# Patient Record
Sex: FEMALE | Race: WHITE | NOT HISPANIC OR LATINO | Employment: PART TIME | URBAN - METROPOLITAN AREA
[De-identification: names, ages, dates, MRNs, and addresses within clinical notes are randomized per-mention and may not be internally consistent; named-entity substitution may affect disease eponyms.]

---

## 2017-10-06 ENCOUNTER — RECORDS - HEALTHEAST (OUTPATIENT)
Dept: LAB | Facility: CLINIC | Age: 52
End: 2017-10-06

## 2017-10-06 LAB
CHOLEST SERPL-MCNC: 133 MG/DL
FASTING STATUS PATIENT QL REPORTED: ABNORMAL
HDLC SERPL-MCNC: 43 MG/DL
LDLC SERPL CALC-MCNC: 54 MG/DL
TRIGL SERPL-MCNC: 179 MG/DL

## 2018-01-17 ENCOUNTER — RECORDS - HEALTHEAST (OUTPATIENT)
Dept: LAB | Facility: CLINIC | Age: 53
End: 2018-01-17

## 2018-01-17 LAB
ANION GAP SERPL CALCULATED.3IONS-SCNC: 11 MMOL/L (ref 5–18)
BUN SERPL-MCNC: 12 MG/DL (ref 8–22)
CALCIUM SERPL-MCNC: 9.6 MG/DL (ref 8.5–10.5)
CHLORIDE BLD-SCNC: 100 MMOL/L (ref 98–107)
CO2 SERPL-SCNC: 25 MMOL/L (ref 22–31)
CREAT SERPL-MCNC: 0.73 MG/DL (ref 0.6–1.1)
GFR SERPL CREATININE-BSD FRML MDRD: >60 ML/MIN/1.73M2
GLUCOSE BLD-MCNC: 188 MG/DL (ref 70–125)
POTASSIUM BLD-SCNC: 4.3 MMOL/L (ref 3.5–5)
SODIUM SERPL-SCNC: 136 MMOL/L (ref 136–145)

## 2018-02-19 ENCOUNTER — HOSPITAL ENCOUNTER (OUTPATIENT)
Dept: LAB | Age: 53
Setting detail: SPECIMEN
Discharge: HOME | End: 2018-02-19

## 2018-02-19 ENCOUNTER — RECORDS - HEALTHEAST (OUTPATIENT)
Dept: LAB | Facility: CLINIC | Age: 53
End: 2018-02-19

## 2018-02-19 LAB — HIV 1+2 AB+HIV1 P24 AG SERPL QL IA: NEGATIVE

## 2018-02-20 LAB
C TRACH DNA SPEC QL PROBE+SIG AMP: NEGATIVE
SYPHILIS RPR SCREEN - HISTORICAL: NORMAL

## 2018-03-10 ENCOUNTER — RECORDS - HEALTHEAST (OUTPATIENT)
Dept: LAB | Facility: CLINIC | Age: 53
End: 2018-03-10

## 2018-03-12 LAB — BACTERIA SPEC CULT: NORMAL

## 2018-07-03 ENCOUNTER — RECORDS - HEALTHEAST (OUTPATIENT)
Dept: LAB | Facility: CLINIC | Age: 53
End: 2018-07-03

## 2018-07-04 LAB — BACTERIA SPEC CULT: NO GROWTH

## 2018-11-28 ENCOUNTER — RECORDS - HEALTHEAST (OUTPATIENT)
Dept: LAB | Facility: CLINIC | Age: 53
End: 2018-11-28

## 2018-11-28 LAB
CHOLEST SERPL-MCNC: 138 MG/DL
FASTING STATUS PATIENT QL REPORTED: ABNORMAL
HDLC SERPL-MCNC: 38 MG/DL
LDLC SERPL CALC-MCNC: 50 MG/DL
TRIGL SERPL-MCNC: 250 MG/DL

## 2019-01-03 ENCOUNTER — RECORDS - HEALTHEAST (OUTPATIENT)
Dept: LAB | Facility: CLINIC | Age: 54
End: 2019-01-03

## 2019-01-03 LAB
ALBUMIN SERPL-MCNC: 3.6 G/DL (ref 3.5–5)
ALP SERPL-CCNC: 109 U/L (ref 45–120)
ALT SERPL W P-5'-P-CCNC: 17 U/L (ref 0–45)
ANION GAP SERPL CALCULATED.3IONS-SCNC: 8 MMOL/L (ref 5–18)
AST SERPL W P-5'-P-CCNC: 12 U/L (ref 0–40)
BILIRUB SERPL-MCNC: 0.4 MG/DL (ref 0–1)
BUN SERPL-MCNC: 9 MG/DL (ref 8–22)
CALCIUM SERPL-MCNC: 9.7 MG/DL (ref 8.5–10.5)
CHLORIDE BLD-SCNC: 100 MMOL/L (ref 98–107)
CO2 SERPL-SCNC: 30 MMOL/L (ref 22–31)
CREAT SERPL-MCNC: 0.75 MG/DL (ref 0.6–1.1)
GFR SERPL CREATININE-BSD FRML MDRD: >60 ML/MIN/1.73M2
GLUCOSE BLD-MCNC: 201 MG/DL (ref 70–125)
POTASSIUM BLD-SCNC: 4.7 MMOL/L (ref 3.5–5)
PROT SERPL-MCNC: 6.3 G/DL (ref 6–8)
SODIUM SERPL-SCNC: 138 MMOL/L (ref 136–145)

## 2019-11-29 ENCOUNTER — RECORDS - HEALTHEAST (OUTPATIENT)
Dept: LAB | Facility: CLINIC | Age: 54
End: 2019-11-29

## 2019-11-29 LAB
ANION GAP SERPL CALCULATED.3IONS-SCNC: 8 MMOL/L (ref 5–18)
BUN SERPL-MCNC: 12 MG/DL (ref 8–22)
CALCIUM SERPL-MCNC: 9.7 MG/DL (ref 8.5–10.5)
CHLORIDE BLD-SCNC: 101 MMOL/L (ref 98–107)
CHOLEST SERPL-MCNC: 155 MG/DL
CO2 SERPL-SCNC: 28 MMOL/L (ref 22–31)
CREAT SERPL-MCNC: 0.71 MG/DL (ref 0.6–1.1)
FASTING STATUS PATIENT QL REPORTED: ABNORMAL
GFR SERPL CREATININE-BSD FRML MDRD: >60 ML/MIN/1.73M2
GLUCOSE BLD-MCNC: 164 MG/DL (ref 70–125)
HDLC SERPL-MCNC: 41 MG/DL
LDLC SERPL CALC-MCNC: 62 MG/DL
POTASSIUM BLD-SCNC: 4.9 MMOL/L (ref 3.5–5)
SODIUM SERPL-SCNC: 137 MMOL/L (ref 136–145)
TRIGL SERPL-MCNC: 262 MG/DL

## 2020-01-23 ENCOUNTER — RECORDS - HEALTHEAST (OUTPATIENT)
Dept: LAB | Facility: CLINIC | Age: 55
End: 2020-01-23

## 2020-01-24 LAB — BACTERIA SPEC CULT: NO GROWTH

## 2020-02-29 ENCOUNTER — RECORDS - HEALTHEAST (OUTPATIENT)
Dept: ADMINISTRATIVE | Facility: OTHER | Age: 55
End: 2020-02-29

## 2020-03-11 LAB
HPV SOURCE: NORMAL
HUMAN PAPILLOMA VIRUS 16 DNA: NEGATIVE
HUMAN PAPILLOMA VIRUS 18 DNA: NEGATIVE
HUMAN PAPILLOMA VIRUS FINAL DIAGNOSIS: NORMAL
HUMAN PAPILLOMA VIRUS OTHER HR: NEGATIVE
SPECIMEN DESCRIPTION: NORMAL

## 2020-03-18 ENCOUNTER — RECORDS - HEALTHEAST (OUTPATIENT)
Dept: ADMINISTRATIVE | Facility: OTHER | Age: 55
End: 2020-03-18

## 2020-03-18 LAB
BKR LAB AP ABNORMAL BLEEDING: NO
BKR LAB AP BIRTH CONTROL/HORMONES: NORMAL
BKR LAB AP CERVICAL APPEARANCE: NORMAL
BKR LAB AP GYN ADEQUACY: NORMAL
BKR LAB AP GYN INTERPRETATION: NORMAL
BKR LAB AP HPV REFLEX: NORMAL
BKR LAB AP LMP: NORMAL
BKR LAB AP PATIENT STATUS: NORMAL
BKR LAB AP PREVIOUS ABNORMAL: 2005
BKR LAB AP PREVIOUS NORMAL: NORMAL
HIGH RISK?: NO
PATH REPORT.COMMENTS IMP SPEC: NORMAL
RESULT FLAG (HE HISTORICAL CONVERSION): NORMAL

## 2020-11-02 ENCOUNTER — RECORDS - HEALTHEAST (OUTPATIENT)
Dept: LAB | Facility: CLINIC | Age: 55
End: 2020-11-02

## 2020-11-02 LAB
ANION GAP SERPL CALCULATED.3IONS-SCNC: 8 MMOL/L (ref 5–18)
BUN SERPL-MCNC: 11 MG/DL (ref 8–22)
CALCIUM SERPL-MCNC: 8.8 MG/DL (ref 8.5–10.5)
CHLORIDE BLD-SCNC: 102 MMOL/L (ref 98–107)
CHOLEST SERPL-MCNC: 144 MG/DL
CO2 SERPL-SCNC: 25 MMOL/L (ref 22–31)
CREAT SERPL-MCNC: 0.72 MG/DL (ref 0.6–1.1)
FASTING STATUS PATIENT QL REPORTED: ABNORMAL
GFR SERPL CREATININE-BSD FRML MDRD: >60 ML/MIN/1.73M2
GLUCOSE BLD-MCNC: 369 MG/DL (ref 70–125)
HDLC SERPL-MCNC: 41 MG/DL
LDLC SERPL CALC-MCNC: 48 MG/DL
POTASSIUM BLD-SCNC: 4.6 MMOL/L (ref 3.5–5)
SODIUM SERPL-SCNC: 135 MMOL/L (ref 136–145)
TRIGL SERPL-MCNC: 277 MG/DL

## 2021-05-06 ENCOUNTER — RECORDS - HEALTHEAST (OUTPATIENT)
Dept: LAB | Facility: CLINIC | Age: 56
End: 2021-05-06

## 2021-05-06 LAB
CREAT UR-MCNC: 87 MG/DL
MICROALBUMIN UR-MCNC: 0.82 MG/DL (ref 0–1.99)
MICROALBUMIN/CREAT UR: 9.4 MG/G

## 2021-05-27 ENCOUNTER — RECORDS - HEALTHEAST (OUTPATIENT)
Dept: LAB | Facility: CLINIC | Age: 56
End: 2021-05-27

## 2021-05-27 LAB
C REACTIVE PROTEIN LHE: 0.5 MG/DL (ref 0–0.8)
CK SERPL-CCNC: 82 U/L (ref 30–190)
ERYTHROCYTE [SEDIMENTATION RATE] IN BLOOD BY WESTERGREN METHOD: 13 MM/HR (ref 0–20)

## 2021-05-28 LAB — B BURGDOR IGG+IGM SER QL: 0.09 INDEX VALUE

## 2021-05-31 ENCOUNTER — RECORDS - HEALTHEAST (OUTPATIENT)
Dept: ADMINISTRATIVE | Facility: CLINIC | Age: 56
End: 2021-05-31

## 2021-06-04 VITALS — WEIGHT: 214 LBS | BODY MASS INDEX: 36.73 KG/M2

## 2021-12-20 ENCOUNTER — LAB REQUISITION (OUTPATIENT)
Dept: LAB | Facility: CLINIC | Age: 56
End: 2021-12-20
Payer: COMMERCIAL

## 2021-12-20 DIAGNOSIS — E11.65 TYPE 2 DIABETES MELLITUS WITH HYPERGLYCEMIA (H): ICD-10-CM

## 2021-12-20 DIAGNOSIS — E78.1 PURE HYPERGLYCERIDEMIA: ICD-10-CM

## 2021-12-20 LAB
ANION GAP SERPL CALCULATED.3IONS-SCNC: 11 MMOL/L (ref 5–18)
BUN SERPL-MCNC: 14 MG/DL (ref 8–22)
CALCIUM SERPL-MCNC: 10.6 MG/DL (ref 8.5–10.5)
CHLORIDE BLD-SCNC: 100 MMOL/L (ref 98–107)
CHOLEST SERPL-MCNC: 165 MG/DL
CO2 SERPL-SCNC: 29 MMOL/L (ref 22–31)
CREAT SERPL-MCNC: 0.76 MG/DL (ref 0.6–1.1)
FASTING STATUS PATIENT QL REPORTED: ABNORMAL
GFR SERPL CREATININE-BSD FRML MDRD: 89 ML/MIN/1.73M2
GLUCOSE BLD-MCNC: 146 MG/DL (ref 70–125)
HDLC SERPL-MCNC: 39 MG/DL
LDLC SERPL CALC-MCNC: 97 MG/DL
LDLC SERPL CALC-MCNC: ABNORMAL MG/DL
POTASSIUM BLD-SCNC: 4.5 MMOL/L (ref 3.5–5)
SODIUM SERPL-SCNC: 140 MMOL/L (ref 136–145)
TRIGL SERPL-MCNC: 436 MG/DL

## 2021-12-20 PROCEDURE — 80048 BASIC METABOLIC PNL TOTAL CA: CPT | Performed by: FAMILY MEDICINE

## 2021-12-20 PROCEDURE — 80061 LIPID PANEL: CPT | Mod: ORL | Performed by: FAMILY MEDICINE

## 2021-12-20 PROCEDURE — 83721 ASSAY OF BLOOD LIPOPROTEIN: CPT | Mod: ORL | Performed by: FAMILY MEDICINE

## 2022-07-17 PROCEDURE — 99284 EMERGENCY DEPT VISIT MOD MDM: CPT

## 2022-07-18 ENCOUNTER — HOSPITAL ENCOUNTER (EMERGENCY)
Facility: CLINIC | Age: 57
Discharge: HOME | End: 2022-07-18
Attending: STUDENT IN AN ORGANIZED HEALTH CARE EDUCATION/TRAINING PROGRAM | Admitting: STUDENT IN AN ORGANIZED HEALTH CARE EDUCATION/TRAINING PROGRAM
Payer: COMMERCIAL

## 2022-07-18 VITALS
WEIGHT: 210 LBS | DIASTOLIC BLOOD PRESSURE: 69 MMHG | HEART RATE: 88 BPM | RESPIRATION RATE: 18 BRPM | TEMPERATURE: 97.8 F | BODY MASS INDEX: 34.99 KG/M2 | SYSTOLIC BLOOD PRESSURE: 120 MMHG | HEIGHT: 65 IN | OXYGEN SATURATION: 95 %

## 2022-07-18 DIAGNOSIS — M54.50 ACUTE MIDLINE LOW BACK PAIN, UNSPECIFIED WHETHER SCIATICA PRESENT: ICD-10-CM

## 2022-07-18 PROCEDURE — 250N000013 HC RX MED GY IP 250 OP 250 PS 637: Performed by: STUDENT IN AN ORGANIZED HEALTH CARE EDUCATION/TRAINING PROGRAM

## 2022-07-18 RX ORDER — METHYLPREDNISOLONE 4 MG
TABLET, DOSE PACK ORAL
Qty: 21 TABLET | Refills: 0 | Status: SHIPPED | OUTPATIENT
Start: 2022-07-18 | End: 2023-03-27

## 2022-07-18 RX ORDER — CYCLOBENZAPRINE HCL 10 MG
10 TABLET ORAL 3 TIMES DAILY PRN
Qty: 20 TABLET | Refills: 0 | Status: SHIPPED | OUTPATIENT
Start: 2022-07-18 | End: 2022-07-18

## 2022-07-18 RX ORDER — OXYCODONE HYDROCHLORIDE 5 MG/1
5 TABLET ORAL ONCE
Status: COMPLETED | OUTPATIENT
Start: 2022-07-18 | End: 2022-07-18

## 2022-07-18 RX ORDER — CYCLOBENZAPRINE HCL 10 MG
10 TABLET ORAL 3 TIMES DAILY PRN
Qty: 12 TABLET | Refills: 0 | Status: SHIPPED | OUTPATIENT
Start: 2022-07-18 | End: 2023-03-27

## 2022-07-18 RX ADMIN — OXYCODONE HYDROCHLORIDE 5 MG: 5 TABLET ORAL at 01:56

## 2022-07-18 ASSESSMENT — ENCOUNTER SYMPTOMS
FEVER: 0
NUMBNESS: 0
ABDOMINAL PAIN: 0
BACK PAIN: 1
WEAKNESS: 0
COUGH: 0

## 2022-07-18 NOTE — ED PROVIDER NOTES
NAME: Jodi Lacy  AGE: 56 year old female  YOB: 1965  MRN: [id number]  EVALUATION DATE & TIME: 2022  1:10 AM    PCP: Mau Patel  ED PROVIDER: Lucia Ortega MD.    Chief Complaint   Patient presents with     Back Pain     Hip Pain         FINAL IMPRESSION:  1. Acute midline low back pain, unspecified whether sciatica present        MEDICAL DECISION MAKIN:38 AM I met with the patient, obtained history, performed an initial exam, and discussed options and plan for diagnostics and treatment here in the ED.    57 y/o F who presents with low back pain. Pain radiates to hips and thighs. She has had pain for last 3 months. Reports she is following with spine clinic and had an MRI about 3-4 weeks ago (I am unable to see this in chart review). Pain is worsening today. No falls. No neurologic deficits, bowel/bladder changes or saddle anesthesia.    Given reassuring neuro exam here, no systemic symptoms, no IVDU/immunocompromsied state (besides h/o T2DM), I do not suspect epidural abscess, discitis, osteomyelitis or other infections process. No symptoms/findings to suggest cauda equina. No new trauma to suspect fracture. Suspect musculoskeletal vs herniated disc vs degenerative. She was given oxycodone with improvement in pain and appears in no acute distress on repeat exam. With no significant red flags or neurologic deficits and per patient recent MRI without emergent findings I discussed that do not feel requires repeat MRI today and patient is in agreement and feels comfortable following up with spine clinic. Will start on steroids and flexeril (not currently on these). Strict return precautions discussed and patient is in agreement with plan, endorses understanding and their questions were answered    MEDICATIONS GIVEN IN THE EMERGENCY:  Medications   oxyCODONE (ROXICODONE) tablet 5 mg (5 mg Oral Given 22 0156)       NEW PRESCRIPTIONS STARTED AT TODAY'S ER VISIT:  New  Prescriptions    CYCLOBENZAPRINE (FLEXERIL) 10 MG TABLET    Take 1 tablet (10 mg) by mouth 3 times daily as needed for muscle spasms    METHYLPREDNISOLONE (MEDROL DOSEPAK) 4 MG TABLET THERAPY PACK    Follow Package Directions        =================================================================  HPI    Patient information was obtained from: Patient    Use of : N/A      Jodi Lacy is a 56 year old female with a past medical history of scapular dyskinesis, Schizoaffective disorder, anxiety, who presents to the ED via EMS for the evaluation of back pain and hip pain.    Patient reports lower back pain for the past three months. She states she has been following with [facility] and had an MRI about 3-4 weeks ago in addition to a cortisone injection. Patient presents to the ED as her back pain has worsened. She has tried Ibuprofen and Tylenol without relief. Otherwise,she denies any recent falls or injuries, fevers, cough, abdominal pain, chest pain, numbness, weakness, urinary or stool incontinence, or bowel or bladder changes. No other complaints at this time.    REVIEW OF SYSTEMS   Review of Systems   Constitutional: Negative for fever.   Respiratory: Negative for cough.    Cardiovascular: Negative for chest pain.   Gastrointestinal: Negative for abdominal pain.        Negative for stool incontinence or changes to bowel habits   Genitourinary:        Negative for urinary incontinence or urinary changes   Musculoskeletal: Positive for back pain (lower).   Neurological: Negative for weakness and numbness.   All other systems reviewed and are negative.     PAST MEDICAL HISTORY:  Past Medical History:   Diagnosis Date     Anxiety      Arthritis      Depression      Depressive disorder      PTSD (post-traumatic stress disorder)      Schizophrenia (H)        PAST SURGICAL HISTORY:  History reviewed. No pertinent surgical history.    CURRENT MEDICATIONS:    No current facility-administered  medications for this encounter.    Current Outpatient Medications:      cyclobenzaprine (FLEXERIL) 10 MG tablet, Take 1 tablet (10 mg) by mouth 3 times daily as needed for muscle spasms, Disp: 12 tablet, Rfl: 0     methylPREDNISolone (MEDROL DOSEPAK) 4 MG tablet therapy pack, Follow Package Directions, Disp: 21 tablet, Rfl: 0     albuterol (ALBUTEROL) 108 (90 BASE) MCG/ACT inhaler, Inhale 2 puffs into the lungs every 6 hours, Disp: , Rfl:      aspirin 81 MG tablet, Take by mouth daily, Disp: , Rfl:      Atorvastatin Calcium (LIPITOR PO), Take 10 mg by mouth, Disp: , Rfl:      buPROPion (WELLBUTRIN XL) 300 MG 24 hr tablet, Take 300 mg by mouth every morning, Disp: , Rfl:      Cholecalciferol (VITAMIN D3 PO), Take 30,000 Units by mouth twice a week , Disp: , Rfl:      HydrOXYzine Pamoate (VISTARIL PO), Take  mg by mouth 3 times daily as needed for itching , Disp: , Rfl:      Levomilnacipran HCl (FETZIMA PO), Take 80 mg by mouth daily, Disp: , Rfl:      metFORMIN (GLUCOPHAGE-XR) 500 MG 24 hr tablet, Take 1,000 mg by mouth daily , Disp: , Rfl:      Naproxen (NAPROSYN PO), , Disp: , Rfl:      norethin-eth estrad biphasic (NECON 10/11, 28,) 0.5-35/1-35 MG-MCG per tablet, Take 1 tablet by mouth daily, Disp: , Rfl:      OMEPRAZOLE PO, Take 20 mg by mouth daily, Disp: , Rfl:      TIZANIDINE HCL PO, Take 4 mg by mouth 2 times daily as needed for muscle spasms, Disp: , Rfl:      TRAZODONE HCL PO, Take  mg by mouth At Bedtime , Disp: , Rfl:      triamterene-hydrochlorothiazide (MAXZIDE-25) 37.5-25 MG per tablet, Take 1 tablet by mouth daily, Disp: , Rfl:     ALLERGIES:  Allergies   Allergen Reactions     Lamictal [Lamotrigine] Other (See Comments)     Mouth sores     Septra [Sulfamethoxazole W/Trimethoprim] Difficulty breathing     Sulfa Drugs Rash       FAMILY HISTORY:  History reviewed. No pertinent family history.    SOCIAL HISTORY:   Social History     Socioeconomic History     Marital status:    Tobacco  "Use     Smoking status: Current Every Day Smoker     Packs/day: 0.50     Types: Cigarettes, Cigarettes   Substance and Sexual Activity     Alcohol use: Yes     Comment: Alcoholic Drinks/day: occasional     Drug use: No     Sexual activity: Not Currently       PHYSICAL EXAM:    Vitals: /75   Pulse 87   Temp 97.8  F (36.6  C) (Temporal)   Resp 18   Ht 1.651 m (5' 5\")   Wt 95.3 kg (210 lb)   SpO2 95%   BMI 34.95 kg/m     Constitutional: Well developed, well nourished. Patient sleeping when I enter the room, wakes easily, appears comfortable  HENT: Normocephalic, atraumatic, mucous membranes moist, nose normal. Neck- Supple, gross ROM intact, no C spine tenderness  Eyes: Pupils mid-range, sclera white, no discharge  Respiratory: Clear to auscultation bilaterally, no respiratory distress, no wheezing, speaks full sentences easily.  Cardiovascular: Normal heart rate, regular rhythm, no murmurs. No lower extremity edema  GI: Soft, not distended, not tender tender to palpation, no palpable masses  Musculoskeletal: Moving all 4 extremities intentionally and without pain. No obvious deformity.  Skin: Warm, dry, no rash.  Neurologic: Alert & oriented x 3, speech clear, moving all extremities spontaneously. Strength 5/5 and sensation to light touch equal and intact in the extremities  Back: no T spine tenderness, lower lumbar midline and parspinal tenderness, no step offs or deformities      LAB:  All pertinent labs reviewed and interpreted.  Labs Ordered and Resulted from Time of ED Arrival to Time of ED Departure - No data to display    RADIOLOGY:  No orders to display         PROCEDURES:   Procedures       ISherif, am serving as a scribe to document services personally performed by Dr. Lucia Ortega based on my observation and the provider's statements to me. I, Lucia Ortega MD attest that Rianna Wallace is acting in a scribe capacity, has observed my performance of the services and has documented them in " accordance with my direction.    Lucia Ortega M.D.  Emergency Medicine  [facility] EMERGENCY ROOM  Counts include 234 beds at the [facility] [facility] [id number] [phone]  Dept: [phone]     Lucia Ortega MD  07/18/22 0949

## 2022-07-18 NOTE — ED TRIAGE NOTES
Pt presents to the ED with c/o low back pain radiating into both hips/legs. Pt has known low back issues and had a cortisone injection about a month ago with some relief.

## 2022-07-18 NOTE — DISCHARGE INSTRUCTIONS
Take the steroids and flexeril as prescribed  Do not drive or drink alcohol with this  Would recommend you stop other muscle relaxer if you are going to take the flexeril  Follow up closely with your spine clinic    Return if you have significant increase in pain, numbness/weakness, bowel/bladder changes, fevers or other worsening symptoms or concerns

## 2022-07-19 DIAGNOSIS — Z71.89 OTHER SPECIFIED COUNSELING: ICD-10-CM

## 2022-11-18 ENCOUNTER — TRANSFERRED RECORDS (OUTPATIENT)
Dept: HEALTH INFORMATION MANAGEMENT | Facility: CLINIC | Age: 57
End: 2022-11-18

## 2022-11-18 LAB
RETINOPATHY: NEGATIVE
RETINOPATHY: NEGATIVE

## 2023-01-03 ENCOUNTER — LAB REQUISITION (OUTPATIENT)
Dept: LAB | Facility: CLINIC | Age: 58
End: 2023-01-03

## 2023-01-03 ENCOUNTER — TRANSFERRED RECORDS (OUTPATIENT)
Dept: HEALTH INFORMATION MANAGEMENT | Facility: CLINIC | Age: 58
End: 2023-01-03

## 2023-01-03 DIAGNOSIS — E11.65 TYPE 2 DIABETES MELLITUS WITH HYPERGLYCEMIA (H): ICD-10-CM

## 2023-01-03 LAB
ANION GAP SERPL CALCULATED.3IONS-SCNC: 16 MMOL/L (ref 7–15)
BUN SERPL-MCNC: 12.4 MG/DL (ref 6–20)
CALCIUM SERPL-MCNC: 10 MG/DL (ref 8.6–10)
CHLORIDE SERPL-SCNC: 98 MMOL/L (ref 98–107)
CHOLEST SERPL-MCNC: 172 MG/DL
CREAT SERPL-MCNC: 0.64 MG/DL (ref 0.51–0.95)
DEPRECATED HCO3 PLAS-SCNC: 26 MMOL/L (ref 22–29)
GFR SERPL CREATININE-BSD FRML MDRD: >90 ML/MIN/1.73M2
GLUCOSE SERPL-MCNC: 244 MG/DL (ref 70–99)
HBA1C MFR BLD: 7.9 % (ref 4.2–6.1)
HDLC SERPL-MCNC: 43 MG/DL
LDLC SERPL CALC-MCNC: 50 MG/DL
NONHDLC SERPL-MCNC: 129 MG/DL
POTASSIUM SERPL-SCNC: 4.7 MMOL/L (ref 3.4–5.3)
SODIUM SERPL-SCNC: 140 MMOL/L (ref 136–145)
TRIGL SERPL-MCNC: 394 MG/DL

## 2023-01-03 PROCEDURE — 82310 ASSAY OF CALCIUM: CPT | Performed by: FAMILY MEDICINE

## 2023-01-03 PROCEDURE — 80061 LIPID PANEL: CPT | Performed by: FAMILY MEDICINE

## 2023-03-27 ENCOUNTER — OFFICE VISIT (OUTPATIENT)
Dept: PHARMACY | Facility: PHYSICIAN GROUP | Age: 58
End: 2023-03-27
Payer: COMMERCIAL

## 2023-03-27 VITALS — DIASTOLIC BLOOD PRESSURE: 70 MMHG | SYSTOLIC BLOOD PRESSURE: 132 MMHG | HEART RATE: 64 BPM

## 2023-03-27 DIAGNOSIS — E78.5 HYPERLIPIDEMIA LDL GOAL <70: ICD-10-CM

## 2023-03-27 DIAGNOSIS — F32.A DEPRESSION, UNSPECIFIED DEPRESSION TYPE: ICD-10-CM

## 2023-03-27 DIAGNOSIS — Z78.9 TAKES DIETARY SUPPLEMENTS: ICD-10-CM

## 2023-03-27 DIAGNOSIS — J30.2 SEASONAL ALLERGIC RHINITIS, UNSPECIFIED TRIGGER: ICD-10-CM

## 2023-03-27 DIAGNOSIS — K21.00 GASTROESOPHAGEAL REFLUX DISEASE WITH ESOPHAGITIS WITHOUT HEMORRHAGE: ICD-10-CM

## 2023-03-27 DIAGNOSIS — G47.00 INSOMNIA, UNSPECIFIED TYPE: ICD-10-CM

## 2023-03-27 DIAGNOSIS — G89.4 CHRONIC PAIN SYNDROME: ICD-10-CM

## 2023-03-27 DIAGNOSIS — E11.9 TYPE 2 DIABETES MELLITUS WITHOUT COMPLICATION, WITHOUT LONG-TERM CURRENT USE OF INSULIN (H): Primary | ICD-10-CM

## 2023-03-27 DIAGNOSIS — F25.1 SCHIZOAFFECTIVE DISORDER, DEPRESSIVE TYPE (H): ICD-10-CM

## 2023-03-27 PROCEDURE — 99605 MTMS BY PHARM NP 15 MIN: CPT | Performed by: PHARMACIST

## 2023-03-27 PROCEDURE — 99607 MTMS BY PHARM ADDL 15 MIN: CPT | Performed by: PHARMACIST

## 2023-03-27 RX ORDER — ARIPIPRAZOLE 20 MG/1
20 TABLET ORAL DAILY
COMMUNITY

## 2023-03-27 RX ORDER — VITAMIN E 268 MG
400 CAPSULE ORAL DAILY
COMMUNITY
End: 2023-08-17

## 2023-03-27 RX ORDER — TOPIRAMATE 25 MG/1
25 TABLET, FILM COATED ORAL 2 TIMES DAILY
COMMUNITY
End: 2023-08-17

## 2023-03-27 RX ORDER — QUETIAPINE FUMARATE 25 MG/1
25-50 TABLET, FILM COATED ORAL
COMMUNITY
End: 2024-06-10

## 2023-03-27 RX ORDER — MONTELUKAST SODIUM 10 MG/1
10 TABLET ORAL AT BEDTIME
COMMUNITY

## 2023-03-27 RX ORDER — NAPROXEN 500 MG/1
500 TABLET ORAL 2 TIMES DAILY WITH MEALS
COMMUNITY
End: 2024-06-10

## 2023-03-27 RX ORDER — METHOCARBAMOL 500 MG/1
750 TABLET, FILM COATED ORAL 4 TIMES DAILY PRN
COMMUNITY

## 2023-03-27 RX ORDER — LYSINE HCL 500 MG
1 TABLET ORAL 2 TIMES DAILY
COMMUNITY

## 2023-03-27 RX ORDER — ARIPIPRAZOLE 5 MG/1
5 TABLET ORAL DAILY
COMMUNITY
End: 2024-07-22

## 2023-03-27 NOTE — LETTER
April 2, 2023  Jodi Lacy  [address]   [place] [address]    Dear Ms. Lacy, [facility]     Thank you for talking with me on Mar 27, 2023 about your health and medications. As a follow-up to our conversation, I have included two documents:      Your Recommended To-Do List has steps you should take to get the best results from your medications.  Your Medication List will help you keep track of your medications and how to take them.    If you want to talk about these documents, please call Katrina Condon RPH at phone: [phone], Monday-Friday 8-4:30pm.    I look forward to working with you and your doctors to make sure your medications work well for you.    Sincerely,  Katrina Condon RPH  [facility] Pharmacist, [facility]

## 2023-03-27 NOTE — LETTER
"Recommended To-Do List      Prepared on: Mar 27, 2023       You can get the best results from your medications by completing the items on this \"To-Do List.\"      Bring your To-Do List when you go to your doctor. And, share it with your family or caregivers.    My To-Do List:  What we talked about: What I should do:    What my medicines are for, how to know if my medicines are working, made sure my medicines are safe for me and reviewed how to take my medicines.      Take my medicines every day                "

## 2023-03-27 NOTE — LETTER
_  Medication List        Prepared on: Mar 27, 2023     Bring your Medication List when you go to the doctor, hospital, or   emergency room. And, share it with your family or caregivers.     Note any changes to how you take your medications.  Cross out medications when you no longer use them.    Medication How I take it Why I use it Prescriber   albuterol (PROAIR HFA/PROVENTIL HFA/VENTOLIN HFA) 108 (90 Base) MCG/ACT inhaler Inhale 2 puffs into the lungs every 6 hours Shortness of Breath Eduardo Stern MD   ARIPiprazole (ABILIFY) 20 MG tablet Take 20 mg by mouth daily .  Take with 5 mg for total of 25 mg daily Major Depressive Disorder Eduardo Stern MD   ARIPiprazole (ABILIFY) 5 MG tablet Take 5 mg by mouth daily .  Take with 20 mg for total of 25 mg daily Major Depressive Disorder Eduardo Stern MD   aspirin 81 MG tablet Take 81 mg by mouth daily Prevent Heart Attack and Stroke Eduardo Stern MD   atorvastatin (LIPITOR) 10 MG tablet Take 10 mg by mouth daily Elevation of Both Cholesterol and Triglycerides in Blood Eduardo Stern MD   buPROPion (WELLBUTRIN XL) 300 MG 24 hr tablet Take 300 mg by mouth every morning Major Depressive Disorder Eduardo Stern MD   Calcium Carbonate-Vit D-Min (CALCIUM 600+D PLUS MINERALS) 600-400 MG-UNIT TABS Take 1 tablet by mouth 2 times daily General Health Patient Reported   levomilnacipran (FETZIMA ER) 80 MG 24 hr capsule Take 80 mg by mouth daily Major Depressive Disorder Eduardo Stern MD   metFORMIN (GLUCOPHAGE-XR) 500 MG 24 hr tablet Take 1,000 mg by mouth 2 times daily (with meals) Type 2 Diabetes Eduardo Stern MD   methocarbamol (ROBAXIN) 500 MG tablet Take 750 mg by mouth 4 times daily as needed for muscle spasms Musculoskeletal Pain Eduardo Stern MD   montelukast (SINGULAIR) 10 MG tablet Take 10 mg by mouth At Bedtime Perennial Allergic Rhinitis Eduardo Stern MD   Multiple Vitamin (MULTIVITAMIN ADULT PO) Take 1 tablet by mouth daily General Health Patient Reported   naproxen  (NAPROSYN) 500 MG tablet Take 500 mg by mouth 2 times daily (with meals) Pain Eduardo Stern MD   omeprazole (PRILOSEC) 20 MG DR capsule Take 20 mg by mouth daily Gastroesophageal Reflux Disease Eduardo Stern MD   QUEtiapine (SEROQUEL) 25 MG tablet Take 25-50 mg by mouth nightly as needed Major Depressive Disorder; Insomnia Eduardo Stern MD   topiramate (TOPAMAX) 25 MG tablet Take 25 mg by mouth 2 times daily  Major Depressive Disorder; Eduardo Stern MD   traZODone (DESYREL) 100 MG tablet Take 100 mg by mouth At Bedtime Trouble Sleeping Eduardo Stern MD   vitamin E (TOCOPHEROL) 400 units (180 mg) capsule Take 400 Units by mouth daily General Health Patient Reported         Add new medications, over-the-counter drugs, herbals, vitamins, or  minerals in the blank rows below.    Medication How I take it Why I use it Prescriber                                      Allergies:      lamictal [lamotrigine]; septra [sulfamethoxazole w/trimethoprim]; sulfa drugs        Side effects I have had:               Other Information:              My notes and questions:

## 2023-03-27 NOTE — PROGRESS NOTES
Medication Therapy Management (MTM) Encounter    ASSESSMENT:                            Medication Adherence/Access: No issues identified    Type 2 Diabetes: Patient is not meeting A1c goal of < 7%.  Her last A1c showed improvement.  We will be rechecking soon at upcoming primary care provider visit.  If elevated could consider addition of SGLT2 inhibitor if affordable.  Is at maximum doses of metformin, glipizide, and Ozempic. Helped her change the settings on the Desiree 2 reader and her phone grisel.  Now the alarm will only go off if blood glucose below 70 or above 400 mg/d    Hyperlipidemia: Patient's triglycerides have chronically been elevated.  Discussed trying to exercise as much as able and try to eat healthy sources of cholesterol, try to avoid sweets.    Depression/schizoaffective disorder/Insomnia: Stable.  Could consider simplifying her medications, and possibly tapering off topiramate unless used for another condition.  Wasn't sure if there was another use other than mood/anxiety.    Chronic pain: Stable.    Allergies: Stable.     GERD: Stable.    Supplements: Stable.    PLAN:                            1.  Continue current medications.  2.  Helped patient change settings on her Desiree monitor, and education provided on general use.    Follow-up: Return in about 5 weeks (around 5/3/2023) for MTM Follow Up.    SUBJECTIVE/OBJECTIVE:                          Jodi Lacy is a 57 year old female coming in for an initial visit. She was referred to me from Dr. Stern.      Reason for visit: Initial medication review.  Referral to help with issues with her desiree to monitor    Allergies/ADRs: Reviewed in chart  Past Medical History: Reviewed in chart  Tobacco: She reports that she has been smoking cigarettes and cigarettes. She has been smoking an average of .5 packs per day. She does not have any smokeless tobacco history on file.Nicotine/Tobacco Cessation Plan:   Information offered: Patient not interested  at this time  Alcohol: Not discussed      Medication Adherence/Access: no issues reported    Type 2 Diabetes:   Ozempic 2 mg weekly  Glipizide ER 10 mg twice daily  Metformin ER 1000 mg twice daily  Has been trying to increase activity levels as able  She has a diabetes follow-up soon with Dr. Stern.Freestyle Desiree 2 - having issues with the alarm going off all the time.  We looked at the settings and it was set to sound off an alarm if her blood sugars went above 240.  We also identified that a few times her blood sugar is going off for a low alarm it could be from pressure or laying on top of the desiree sensor.  This can give false low readings.  Blood Sugar Ranges (per continuous glucose monitor):   See below  Symptoms of low blood sugar? shaky, dizzy, weak.  Does not recall last time this occurred  Symptoms of high blood sugar? none  Eye exam: up to date  Foot exam: up to date  Diet/Exercise:   She cut out sugary drinks and pop which has helped with blood sugars overall.  Is unable to exercise much due to her back pain.  She is hoping to continue being more active and do walks as the weather gets nicer  Aspirin: Taking 81mg daily for primary prevention  and denies side effects   Statin: Yes  ACEi/ARB: No  Microalbumin: Due, within normal limits last time it was checked  Last A1c- 7.9% on 1/3/23    Hyperlipidemia:   Atorvastatin 10 mg daily  Patient reports no significant myalgias or other side effects that she thinks are related to her statin.  She does have some issues with back pain.  Recent Labs   Lab Test 01/03/23  1024 12/20/21  1054   CHOL 172 165   HDL 43* 39*   LDL 50 97   TRIG 394* 436*     Depression/schizoaffective disorder/Insomnia:    Abilify 25 mg daily  Topiramate 25 mg twice daily   Seroquel 25-50 mg at bedtime  Trazodone 100 mg daily  Fetzima 80 mg daily  Bupropion  mg daily  Patient denies issues with sleep.  Has been feeling well rested when she wakes up in the morning.  Feels her mood  has been doing well overall and feels these medications have been effective.  Has not noticed any side effects with them.  Follows with psychiatrist, Dr. Angie Blanchard.    Chronic pain:    Tramadol 50 mg as needed   Meloxicam 15 mg daily as needed   Naproxen 500 mg twice daily as needed  Methocarbamol 750 mg every 4 hours   Has issues with back pain.  The methocarbamol is helpful and uses this periodically.  Did not discuss  Does not use meloxicam or naproxen the same day.  Reports these are helpful with joint pain when needed.  Only uses tramadol for severe back pain that methocarbamol can help with    Allergies/shortness of breath:   Albuterol HFA inhaler -rarely uses or has shortness of breath   montelukast 10 mg daily  Triggers include: smoke, upper respiratory infections and pollens.  Patient reports allergy symptoms of congestion, sneezing, itching.  Typically occurs in the spring and fall time    GERD:   Omeprazole 20 mg daily  Patient reports no current symptoms.   The patient does not have a history of GI bleed.  The patient does notice symptoms if they miss a dose. Patient feels that current regimen is effective.    Supplements:   Calcium-vitamin D 600-400 mg-units twice daily  Vitamin E 400 units daily  Denies issues with her supplement                Today's Vitals: /70   Pulse 64  -Declines height and weight today  ----------------      I spent 45 minutes with this patient today (an extra 15 minutes was spent creating the Medication Action Plan). All changes were made via collaborative practice agreement with BENIGNO RESTREPO MD. A copy of the visit note was provided to the patient's provider(s).    A summary of these recommendations was mailed to the patient.    Katrina Clark, [place]  Medication Therapy Management Pharmacist  Pager: [phone]     Medication Therapy Recommendations  No medication therapy recommendations to display

## 2023-06-27 ENCOUNTER — TRANSFERRED RECORDS (OUTPATIENT)
Dept: HEALTH INFORMATION MANAGEMENT | Facility: CLINIC | Age: 58
End: 2023-06-27

## 2023-06-27 LAB — HBA1C MFR BLD: 8.5 % (ref 4.2–6.1)

## 2023-08-17 ENCOUNTER — VIRTUAL VISIT (OUTPATIENT)
Dept: PHARMACY | Facility: PHYSICIAN GROUP | Age: 58
End: 2023-08-17
Payer: COMMERCIAL

## 2023-08-17 VITALS
HEART RATE: 110 BPM | WEIGHT: 217.4 LBS | DIASTOLIC BLOOD PRESSURE: 82 MMHG | HEIGHT: 65 IN | SYSTOLIC BLOOD PRESSURE: 128 MMHG | BODY MASS INDEX: 36.22 KG/M2

## 2023-08-17 DIAGNOSIS — E11.9 TYPE 2 DIABETES MELLITUS WITHOUT COMPLICATION, WITHOUT LONG-TERM CURRENT USE OF INSULIN (H): Primary | ICD-10-CM

## 2023-08-17 DIAGNOSIS — G47.00 INSOMNIA, UNSPECIFIED TYPE: ICD-10-CM

## 2023-08-17 DIAGNOSIS — F25.1 SCHIZOAFFECTIVE DISORDER, DEPRESSIVE TYPE (H): ICD-10-CM

## 2023-08-17 DIAGNOSIS — Z78.9 TAKES DIETARY SUPPLEMENTS: ICD-10-CM

## 2023-08-17 DIAGNOSIS — F32.A DEPRESSION, UNSPECIFIED DEPRESSION TYPE: ICD-10-CM

## 2023-08-17 PROCEDURE — 99607 MTMS BY PHARM ADDL 15 MIN: CPT | Performed by: PHARMACIST

## 2023-08-17 PROCEDURE — 99606 MTMS BY PHARM EST 15 MIN: CPT | Performed by: PHARMACIST

## 2023-08-17 RX ORDER — SEMAGLUTIDE 2.68 MG/ML
2 INJECTION, SOLUTION SUBCUTANEOUS
COMMUNITY
End: 2024-06-10

## 2023-08-17 NOTE — PROGRESS NOTES
Medication Therapy Management (MTM) Encounter    ASSESSMENT:                            Medication Adherence/Access: No issues identified    Type 2 Diabetes: Patient is not meeting A1c goal of < 7%.  Her home blood glucose have been going up.  She has been eating some more high carb/less healthy foods, she plans to work on this.  Has a goal of weight loss.  May be beneficial to try switching Ozempic to Mounjaro and titrating dose up.  Has primary care provider appointment next week, will defer medication changes to then.    Would recommend we avoid most weight loss meds: phentermine due to anxiety, didn't tolerate topiramate.  Could possibly consider adding low dose naltrexone or maximizing her Wellbutrin dose - would need to discuss with psych.  Would recommend focusing on diet changes and increasing activity as able for now.  .  Depression/schizoaffective disorder/Insomnia: Stable.   .  Supplements: OK to stop vitamin E supplement, not routinely recommended.    PLAN:                            1.  Stop Vitamin E supplement    2.  Discussed trying to reduce sugary drinks (lemonade, frappes) in diet or drinks that spike up blood sugars (diet Monster).  Discussed watching portions of fruit, change sugary cereals to other options.  Try to increase exercise as able.    Follow-up: Return in about 6 weeks (around 9/28/2023) for MTM Follow Up.  - Could consider switching Ozempic to Mounjaro in the future.  If switching, would change to Mounjaro 7.5 mg once weekly and titrate further from that dose if not as effective    SUBJECTIVE/OBJECTIVE:                          Jodi Lacy is a 57 year old female coming in for an initial visit. She was referred to me from Dr. Stern.      Reason for visit: Initial medication review.  Referral to help with issues with her mitzi to monitor    Allergies/ADRs: Reviewed in chart  Past Medical History: Reviewed in chart  Tobacco: She reports that she has been smoking cigarettes and  cigarettes. She has been smoking an average of .5 packs per day. She does not have any smokeless tobacco history on file.Nicotine/Tobacco Cessation Plan:   Information offered: Patient not interested at this time  Alcohol: Not discussed    Medication Adherence/Access: no issues reported    HPI:  Patient was in the ED at [facility] on 8/11, she had accidentally taken two doses of Ozempic in one week within 3 days. After she was feeling unwell, has a tingling sensation to her left arm, fatigue, dizziness.      Type 2 Diabetes:   Ozempic 2 mg weekly  Glipizide ER 10 mg twice daily  Metformin ER 1000 mg twice daily  Has been trying to increase activity levels as able  She has a diabetes follow-up soon with Dr. Stern.Realitycheck Desiree 2 - having issues with the alarm going off all the time.  We looked at the settings and it was set to sound off an alarm if her blood sugars went above 240.  We also identified that a few times her blood sugar is going off for a low alarm it could be from pressure or laying on top of the desiree sensor.  This can give false low readings.  Weight loss meds: topiramate (memory issues)  Blood Sugar Ranges (per continuous glucose monitor):   Continuous Glucose Monitoring Results:   Average Glucose Last 28 Days: 228 mg/dl  Glucose Management Indicator (GMI) 8.8%  Time in Target Last 28 Days:  Above 180 mg/dL: 77%  In target  mg/dL: 23%  Below 70 mg/dL: 0%  Symptoms of low blood sugar? shaky, dizzy, weak.  Does not recall last time this occurred  Symptoms of high blood sugar? none  Eye exam: up to date  Foot exam: up to date  Diet/Exercise:   Diet monsters -   Has been drinking some lemonade, frappe.  Food-   Cereal in the morning, sweet brands.  Fruit- berries, bananas,   Aspirin: Taking 81mg daily for primary prevention  and denies side effects   Statin: Yes  ACEi/ARB: No  Microalbumin: Due, within normal limits last time it was checked  A1c:  8.5% - 6/27/23  7.9% -  "1/3/23    Depression/schizoaffective disorder/Insomnia:    Abilify 25 mg daily  Topiramate 25 mg twice daily   Seroquel 25-50 mg at bedtime  Trazodone 100 mg daily  Fetzima 80 mg daily  Bupropion  mg daily  Patient denies issues with sleep.  Has been feeling well rested when she wakes up in the morning.  Feels her mood has been doing well overall and feels these medications have been effective.  Has not noticed any side effects with them.  Follows with psychiatrist, Dr. Angie Blanchard.    Supplements:   Calcium-vitamin D 600-400 mg-units twice daily  Vitamin E 400 units daily  Multivitamin daily  She is wondering if she could stop any medications or supplements.  Discussed that vitamin E is not usually recommended, could also consider stopping the multivitamin.  She'd like to continue her multivitamin.    /82   Pulse 110   Ht 5' 4.5\" (1.638 m)   Wt 217 lb 6.4 oz (98.6 kg)   BMI 36.74 kg/m      ----------------      I spent 45 minutes with this patient today (an extra 15 minutes was spent creating the Medication Action Plan). All changes were made via collaborative practice agreement with BENIGNO RESTREPO MD. A copy of the visit note was provided to the patient's provider(s).    A summary of these recommendations was mailed to the patient.    Katrina Clark, PharmD  Medication Therapy Management Pharmacist  Pager: [phone]     Medication Therapy Recommendations  Takes dietary supplements    Current Medication: vitamin E (TOCOPHEROL) 400 units (180 mg) capsule (Discontinued)   Rationale: No medical indication at this time - Unnecessary medication therapy - Indication   Recommendation: Discontinue Medication   Status: Accepted per CPA            "

## 2023-08-22 ENCOUNTER — TRANSFERRED RECORDS (OUTPATIENT)
Dept: HEALTH INFORMATION MANAGEMENT | Facility: CLINIC | Age: 58
End: 2023-08-22

## 2023-08-22 LAB — HBA1C MFR BLD: 9 % (ref 4.2–6.1)

## 2023-11-19 ENCOUNTER — HOSPITAL ENCOUNTER (EMERGENCY)
Facility: CLINIC | Age: 58
Discharge: HOME | End: 2023-11-19
Attending: EMERGENCY MEDICINE | Admitting: EMERGENCY MEDICINE
Payer: COMMERCIAL

## 2023-11-19 VITALS
RESPIRATION RATE: 16 BRPM | HEART RATE: 97 BPM | WEIGHT: 203 LBS | DIASTOLIC BLOOD PRESSURE: 68 MMHG | TEMPERATURE: 97 F | SYSTOLIC BLOOD PRESSURE: 111 MMHG | OXYGEN SATURATION: 96 % | HEIGHT: 65 IN | BODY MASS INDEX: 33.82 KG/M2

## 2023-11-19 DIAGNOSIS — T14.8XXA WOUND INFECTION: ICD-10-CM

## 2023-11-19 DIAGNOSIS — L08.9 WOUND INFECTION: ICD-10-CM

## 2023-11-19 PROCEDURE — 99284 EMERGENCY DEPT VISIT MOD MDM: CPT | Mod: 25

## 2023-11-19 PROCEDURE — 99283 EMERGENCY DEPT VISIT LOW MDM: CPT

## 2023-11-19 PROCEDURE — 76882 US LMTD JT/FCL EVL NVASC XTR: CPT

## 2023-11-19 RX ORDER — CEPHALEXIN 500 MG/1
500 CAPSULE ORAL 4 TIMES DAILY
Qty: 28 CAPSULE | Refills: 0 | Status: SHIPPED | OUTPATIENT
Start: 2023-11-19 | End: 2024-06-10

## 2023-11-19 ASSESSMENT — ENCOUNTER SYMPTOMS
CHILLS: 0
ARTHRALGIAS: 1
COUGH: 0
FEVER: 0
FATIGUE: 0
ABDOMINAL PAIN: 0
DYSURIA: 0
VOMITING: 0
COLOR CHANGE: 1
SHORTNESS OF BREATH: 0
JOINT SWELLING: 1
WOUND: 1
HEADACHES: 0
WEAKNESS: 0

## 2023-11-19 ASSESSMENT — ACTIVITIES OF DAILY LIVING (ADL): ADLS_ACUITY_SCORE: 33

## 2023-11-19 NOTE — ED PROVIDER NOTES
"EMERGENCY DEPARTMENT ENCOUNTER      NAME: Jodi Lacy  AGE: 58 year old female  YOB: 1965  MRN: [id number]  EVALUATION DATE & TIME: 11/19/2023  8:25 AM    PCP: Liana Arenas    ED PROVIDER: Lynette Can DO      Chief Complaint   Patient presents with    Wound Check         FINAL IMPRESSION:  1. Wound infection          ED COURSE & MEDICAL DECISION MAKING:    Pertinent Labs & Imaging studies reviewed. (See chart for details)  9:22 AM I met the patient and performed my initial interview and exam.  9:46 AM Rechecked and updated the patient. We discussed the plan for discharge and the patient is agreeable. Reviewed supportive cares, symptomatic treatment, outpatient follow up, and reasons to return to the Emergency Department. Patient to be discharged by ED RN.     58 year old female presents to the Emergency Department for evaluation of redness and pain to sole of foot.  Recent plantar wart removal 3 days ago to her heel.  On Friday reports primary care doctor placed her on ciprofloxacin for reported UTI and URI.  She denies any current urinary symptoms and reports this urine sample was obtained \"a couple weeks ago\".  I am unable to access her medical record.  No fever.  She has not been wrapping the foot.  No drainage.  Mild surrounding erythema, some tracks toward ankle.  No palpable abscess.  No fluid collection on ultrasound.  No necrotizing infection.  She is non toxic.  Discussed symptomatic care.  Placed a bulky dressing.  Will switch her antibiotic to cephalexin for better skin coverage (presumably will also cover urine however no access to her culture.  Reassuringly doesn't sound like patient has been having any UTI symptoms).      At the conclusion of the encounter I discussed the results of all of the tests and the disposition. The questions were answered. The patient or family acknowledged understanding and was agreeable with the care plan.     Medical Decision " Making    History:  Supplemental history from: N/A  External Record(s) reviewed: Outpatient Record: Urgent Care Visit 11/10/23    Work Up:  Chart documentation includes differential considered and any EKGs or imaging independently interpreted by provider, where specified.  In additional to work up documented, I considered the following work up: Documented in chart, if applicable.    External consultation:  Discussion of management with another provider: Documented in chart, if applicable    Complicating factors:  Care impacted by chronic illness: Diabetes  Care affected by social determinants of health: N/A    Disposition considerations: Discharge. I prescribed additional prescription strength medication(s) as charted. See documentation for any additional details.      MEDICATIONS GIVEN IN THE EMERGENCY:  Medications - No data to display    NEW PRESCRIPTIONS STARTED AT TODAY'S ER VISIT  New Prescriptions    CEPHALEXIN (KEFLEX) 500 MG CAPSULE    Take 1 capsule (500 mg) by mouth 4 times daily          =================================================================    HPI    Patient information was obtained from: Patient    Use of : N/A      Jodi Lacy is a 58 year old female with a pertinent history of diabetes who presents to this ED by walk in with a friend for evaluation of a wound on her left heel.    The patient reports she had a plantar wart removed from her left heel by podiatry on 11/16/23. After the procedure they placed a dressing over the wound which she was supposed to remove the next morning. On Friday she contacted her PCP who started on her on ciprofloxacin for a UTI and upper respiratory symptoms. This morning she noticed increased pain, redness, and swelling to the left heel. She is concerned about possible infection of the wound. She has not had any fevers. She has 8 days of antibiotics left.    REVIEW OF SYSTEMS   Review of Systems   Constitutional:  Negative for chills,  fatigue and fever.   Respiratory:  Negative for cough and shortness of breath.    Cardiovascular:  Negative for chest pain.   Gastrointestinal:  Negative for abdominal pain and vomiting.   Genitourinary:  Negative for dysuria.   Musculoskeletal:  Positive for arthralgias (left heel) and joint swelling (left heel).   Skin:  Positive for color change (redness to left heel) and wound (left heel).   Neurological:  Negative for syncope, weakness and headaches.       PAST MEDICAL HISTORY:  Past Medical History:   Diagnosis Date    Anxiety     Arthritis     Depression     Depressive disorder     PTSD (post-traumatic stress disorder)     Schizophrenia (H)        PAST SURGICAL HISTORY:  History reviewed. No pertinent surgical history.        CURRENT MEDICATIONS:    cephALEXin (KEFLEX) 500 MG capsule  albuterol (PROAIR HFA/PROVENTIL HFA/VENTOLIN HFA) 108 (90 Base) MCG/ACT inhaler  ARIPiprazole (ABILIFY) 20 MG tablet  ARIPiprazole (ABILIFY) 5 MG tablet  aspirin 81 MG tablet  atorvastatin (LIPITOR) 10 MG tablet  buPROPion (WELLBUTRIN XL) 300 MG 24 hr tablet  Calcium Carbonate-Vit D-Min (CALCIUM 600+D PLUS MINERALS) 600-400 MG-UNIT TABS  Continuous Blood Gluc Sensor (FREESTYLE ELAN 2 SENSOR) MISC  levomilnacipran (FETZIMA ER) 80 MG 24 hr capsule  metFORMIN (GLUCOPHAGE-XR) 500 MG 24 hr tablet  methocarbamol (ROBAXIN) 500 MG tablet  montelukast (SINGULAIR) 10 MG tablet  Multiple Vitamin (MULTIVITAMIN ADULT PO)  naproxen (NAPROSYN) 500 MG tablet  omeprazole (PRILOSEC) 20 MG DR capsule  QUEtiapine (SEROQUEL) 25 MG tablet  Semaglutide, 2 MG/DOSE, (OZEMPIC, 2 MG/DOSE,) 8 MG/3ML pen  traZODone (DESYREL) 100 MG tablet         ALLERGIES:  Allergies   Allergen Reactions    Lamictal [Lamotrigine] Other (See Comments)     Mouth sores    Septra [Sulfamethoxazole-Trimethoprim] Difficulty breathing    Sulfa Antibiotics Rash       FAMILY HISTORY:  History reviewed. No pertinent family history.    SOCIAL HISTORY:   Social History  "    Socioeconomic History    Marital status:    Tobacco Use    Smoking status: Every Day     Packs/day: .5     Types: Cigarettes   Substance and Sexual Activity    Alcohol use: Yes     Comment: Alcoholic Drinks/day: occasional    Drug use: No    Sexual activity: Not Currently       VITALS:  /68   Pulse 97   Temp 97  F (36.1  C) (Temporal)   Resp 16   Ht 1.651 m (5' 5\")   Wt 92.1 kg (203 lb)   SpO2 96%   BMI 33.78 kg/m      PHYSICAL EXAM    Physical Exam  Vitals and nursing note reviewed.   Constitutional:       General: She is not in acute distress.     Appearance: Normal appearance.   HENT:      Head: Normocephalic and atraumatic.   Eyes:      Pupils: Pupils are equal, round, and reactive to light.   Cardiovascular:      Rate and Rhythm: Normal rate and regular rhythm.   Pulmonary:      Effort: Pulmonary effort is normal.   Abdominal:      General: Abdomen is flat.   Musculoskeletal:         General: Normal range of motion.   Feet:      Comments: 0.5 cm wound to the left heel with mild surrounding redness and tenderness to palpation.  Skin:     General: Skin is warm and dry.   Neurological:      General: No focal deficit present.      Mental Status: She is alert.      Gait: Gait normal.          LAB:  All pertinent labs reviewed and interpreted.  Labs Ordered and Resulted from Time of ED Arrival to Time of ED Departure - No data to display    RADIOLOGY:  Reviewed all pertinent imaging. Please see official radiology report.  No orders to display       PROCEDURES:   PROCEDURE: Emergency Department Limited Bedside Screening Ultrasound   ANATOMICAL WINDOW: Left heel   INDICATIONS: Wound with surrounding redness and swelling   PROCEDURE PROVIDER: Dr. Miri Can   FINDINGS: No fluid collection   IMAGES PRINTED & SCANNED OR SAVED TO MEMORY: NO         I, Joshua Torres, am serving as a scribe to document services personally performed by Dr. Lynette Can based on my observation and the provider's " statements to me. I, Lynette Can DO attest that Joshua Torres is acting in a scribe capacity, has observed my performance of the services and has documented them in accordance with my direction.    Lynette Can DO  Emergency Medicine  [facility] EMERGENCY ROOM  [address] [address]  [phone]  Dept: [phone]     Lynette Can DO  11/19/23 2041

## 2023-11-19 NOTE — ED TRIAGE NOTES
The patient presents to the ED with increased pain, redness and swelling that began Friday after having a plantars wart removed from her left heel. The patient reports swelling was worse this morning upon waking. The patient is an insulin dependent diabetic. She had the wart removed at a foot clinic. She has been taking ciprofloxacin. Denies fever.

## 2023-11-19 NOTE — DISCHARGE INSTRUCTIONS
Discussed, there is concern that this increasing redness around your wound may be a developing infection, may also be trauma from pressure to the area  Start cephalexin.  This has better coverage for skin and soft tissue and should cover any urine source of infection as well.  Take for one week.  Keep a padded dressing to the area, elevate, avoid any extra pressure to the area  You may take the dressing off nightly  Follow-up closely with your primary doctor, return if any acute worsening symptoms, significant spreading redness or any other concerns

## 2023-11-19 NOTE — ED NOTES
Discharge instructions reviewed with pt, verbalized understanding of instructions. Alert and oriented x 4, ambulatory with steady gait.

## 2024-05-07 ENCOUNTER — LAB REQUISITION (OUTPATIENT)
Dept: LAB | Facility: CLINIC | Age: 59
End: 2024-05-07
Payer: COMMERCIAL

## 2024-05-07 ENCOUNTER — TRANSFERRED RECORDS (OUTPATIENT)
Dept: HEALTH INFORMATION MANAGEMENT | Facility: CLINIC | Age: 59
End: 2024-05-07

## 2024-05-07 ENCOUNTER — HOSPITAL ENCOUNTER (OUTPATIENT)
Facility: CLINIC | Age: 59
Discharge: HOME | End: 2024-05-07
Admitting: FAMILY MEDICINE
Payer: COMMERCIAL

## 2024-05-07 DIAGNOSIS — E78.1 PURE HYPERGLYCERIDEMIA: ICD-10-CM

## 2024-05-07 DIAGNOSIS — E11.65 TYPE 2 DIABETES MELLITUS WITH HYPERGLYCEMIA (H): ICD-10-CM

## 2024-05-07 LAB — HBA1C MFR BLD: 7.9 % (ref 4.2–6.1)

## 2024-05-07 PROCEDURE — 82043 UR ALBUMIN QUANTITATIVE: CPT | Performed by: FAMILY MEDICINE

## 2024-05-07 PROCEDURE — 80061 LIPID PANEL: CPT | Mod: ORL | Performed by: FAMILY MEDICINE

## 2024-05-07 PROCEDURE — 80048 BASIC METABOLIC PNL TOTAL CA: CPT | Mod: ORL | Performed by: FAMILY MEDICINE

## 2024-05-08 LAB
ANION GAP SERPL CALCULATED.3IONS-SCNC: 13 MMOL/L (ref 7–15)
BUN SERPL-MCNC: 9.4 MG/DL (ref 6–20)
CALCIUM SERPL-MCNC: 9.6 MG/DL (ref 8.6–10)
CHLORIDE SERPL-SCNC: 100 MMOL/L (ref 98–107)
CHOLEST SERPL-MCNC: 141 MG/DL
CREAT SERPL-MCNC: 0.66 MG/DL (ref 0.51–0.95)
CREAT UR-MCNC: 42.2 MG/DL
DEPRECATED HCO3 PLAS-SCNC: 24 MMOL/L (ref 22–29)
EGFRCR SERPLBLD CKD-EPI 2021: >90 ML/MIN/1.73M2
FASTING STATUS PATIENT QL REPORTED: ABNORMAL
FASTING STATUS PATIENT QL REPORTED: NORMAL
GLUCOSE SERPL-MCNC: 139 MG/DL (ref 70–99)
HDLC SERPL-MCNC: 51 MG/DL
LDLC SERPL CALC-MCNC: 62 MG/DL
MICROALBUMIN UR-MCNC: <12 MG/L
MICROALBUMIN/CREAT UR: NORMAL MG/G{CREAT}
NONHDLC SERPL-MCNC: 90 MG/DL
POTASSIUM SERPL-SCNC: 4.2 MMOL/L (ref 3.4–5.3)
SODIUM SERPL-SCNC: 137 MMOL/L (ref 135–145)
TRIGL SERPL-MCNC: 140 MG/DL

## 2024-06-10 ENCOUNTER — OFFICE VISIT (OUTPATIENT)
Dept: PHARMACY | Facility: PHYSICIAN GROUP | Age: 59
End: 2024-06-10
Payer: COMMERCIAL

## 2024-06-10 DIAGNOSIS — Z78.9 TAKES DIETARY SUPPLEMENTS: ICD-10-CM

## 2024-06-10 DIAGNOSIS — F32.A DEPRESSION, UNSPECIFIED DEPRESSION TYPE: ICD-10-CM

## 2024-06-10 DIAGNOSIS — G47.00 INSOMNIA, UNSPECIFIED TYPE: ICD-10-CM

## 2024-06-10 DIAGNOSIS — F25.1 SCHIZOAFFECTIVE DISORDER, DEPRESSIVE TYPE (H): ICD-10-CM

## 2024-06-10 DIAGNOSIS — G89.4 CHRONIC PAIN SYNDROME: ICD-10-CM

## 2024-06-10 DIAGNOSIS — E11.9 TYPE 2 DIABETES MELLITUS WITHOUT COMPLICATION, WITHOUT LONG-TERM CURRENT USE OF INSULIN (H): Primary | ICD-10-CM

## 2024-06-10 PROCEDURE — 99607 MTMS BY PHARM ADDL 15 MIN: CPT | Performed by: PHARMACIST

## 2024-06-10 PROCEDURE — 99605 MTMS BY PHARM NP 15 MIN: CPT | Performed by: PHARMACIST

## 2024-06-10 RX ORDER — TIRZEPATIDE 12.5 MG/.5ML
12.5 INJECTION, SOLUTION SUBCUTANEOUS
COMMUNITY
Start: 2024-06-10 | End: 2024-07-22

## 2024-06-10 RX ORDER — MELOXICAM 15 MG/1
7.5 TABLET ORAL 2 TIMES DAILY PRN
COMMUNITY
Start: 2023-05-12

## 2024-06-10 NOTE — LETTER
"Recommended To-Do List      Prepared on: 06/12/2024       You can get the best results from your medications by completing the items on this \"To-Do List.\"      Bring your To-Do List when you go to your doctor. And, share it with your family or caregivers.    My To-Do List:  What we talked about: What I should do:   Your medication dosage being too low     Stop Lantus insulin  Increase Mounjaro to 12.5 mg weekly          What we talked about: What I should do:                     "

## 2024-06-10 NOTE — LETTER
June 12, 2024  Jodi Lacy  [address]   [place] [address]    Dear Ms. Lacy, [facility]     Thank you for talking with me on Pranav 10, 2024 about your health and medications. As a follow-up to our conversation, I have included two documents:      Your Recommended To-Do List has steps you should take to get the best results from your medications.  Your Medication List will help you keep track of your medications and how to take them.    If you want to talk about these documents, please call Katrina Condon RPH at phone: [phone], Monday-Friday 8-4:30pm.    I look forward to working with you and your doctors to make sure your medications work well for you.    Sincerely,  Katrina Condon RPH  [facility] Pharmacist, [facility]

## 2024-06-10 NOTE — PROGRESS NOTES
Can you please help make sure mom makes peds surgery appt for hernia and also schedules US for Jonas due to breech delivery? thanks   Medication Therapy Management (MTM) Encounter    ASSESSMENT:                            Medication Adherence/Access: see below    Diabetes: Patient is not meeting A1c goal of < 7%.  May benefit from further titration of Mounjaro - can discontinue Lantus since she isn't taking it often.  Would benefit from getting the Desiree 3.    Depression/schizoaffective disorder/Insomnia: Stable.     Pain:  working with [employer].    Hyperlipidemia: Stable    Supplements: Stable    PLAN:                             Stop Lantus insulin  Increase Mounjaro to 12.5 mg weekly  Changed prescription for the Desiree sensors to Desiree 3, sent sensors to pharmacy.  Helped her download the new grisel on her phone and gave her a sample sensor in clinic.    Follow-up: 7/22/24 follow up with [facility] pharmacist    SUBJECTIVE/OBJECTIVE:                          Jodi Lacy is a 58 year old female coming in for a follow up     Reason for visit: follow up medication review    Allergies/ADRs: Reviewed in chart  Past Medical History: Reviewed in chart  Tobacco: She reports that she has been smoking cigarettes and cigarettes. She does not have any smokeless tobacco history on file.Nicotine/Tobacco Cessation Plan:   Information offered: Patient not interested at this time  Alcohol: Not discussed    Medication Adherence/Access: no issues reported  Only remembers the Lantus 3 nights a week    Diabetes:   - Mounjaro 10 mg weekly  - Metformin ER 1000 mg twice daily  - Lantus 10 units daily- 2-3 nights a week - prescribed by a non-[facility] provider (wasn't in [facility])  - Lisinopril 5 mg daily - renal protection  She was seeing another provider for awhile who had started her on Lantus insulin.  She has been taking Lantus 10 units daily, but only getting in her dose about 2-3 nights a week.  It's hard to remember.    Reports nocturia, waking up 3-5 times at night to void.  Declines decreasing fluid intake at work, is very thirsty at work and would be hard to  stop drinking water at 7 PM (works until 10 pm)  Blood Sugar Ranges (per continuous glucose monitor):   Report Period: 05/11/2024 - 06/07/2024 (28 days)  Average Glucose: 182 mg/dL  Glucose Management Indicator (GMI): 7.7%  Very High: >250 mg/dL --- 8%  High: 181 - 250 mg/dL --- 36%  Target Range: 70 - 180 mg/dL --- 56%  Low: 54 - 69 mg/dL --- 0%  Very Low: <54 mg/dL --- 0%  Symptoms of low blood sugar? shaky, dizzy, weak.  Does not recall last time this occurred  Symptoms of high blood sugar? polyuria  Eye exam: No, due  Foot exam: up to date  Diet/Exercise:   Has been drinking some lemonade, frappe drinks, diet monster.  Cereal in the morning, sweet brands.  Fruit- berries, bananas,   Microalbumin: Due, within normal limits last time it was checked    Depression/schizoaffective disorder/Insomnia:    - Abilify 25 mg daily  - Seroquel 25-50 mg at bedtime - not taking  - Trazodone 100 mg daily  - Fetzima 80 mg daily  - Bupropion  mg daily  Patient denies issues with sleep.  Has been feeling well rested when she wakes up in the morning.  Feels her mood has been doing well overall and feels these medications have been effective.  Has not noticed any side effects with them.  Follows with psychiatrist, Dr. Angie Blanchard.    Hyperlipidemia:   - Atorvastatin 10 mg daily  Patient reports no significant myalgias or other side effects that she thinks are related to her statin.     Pain:  Meloxicam 15 mg, 1/2 tablet twice daily - doesn't take regularly  -Tramadol 50 mg daily as needed  -Methocarbamol 750 mg four times daily as needed   Had bone on bone arthritis in her hands, they are considering surgery.  She tries to limit how often she takes tramadol, doesn't want to become dependant.  Isn't sure if any of her pain meds are going into her pill dispense, she will ask her RN.  Doesn't take may as needed doses of her meds because she just doesn't think to.    Supplements:   - Calcium-vitamin D 600-400 mg-units twice  daily  - Multivitamin daily  No concerns at this time, prefers to continue multivitamin.    There were no vitals taken for this visit.- ran out of time to check today.    ----------------    I spent 60 minutes with this patient today. All changes were made via collaborative practice agreement with Eduardo Stern MD. A copy of the visit note was provided to the patient's provider(s).    A summary of these recommendations was declined by the patient.    Katrina Condon, HollyD  Medication Therapy Management Pharmacist         Medication Therapy Recommendations  No medication therapy recommendations to display

## 2024-06-10 NOTE — LETTER
_  Medication List        Prepared on: 06/12/2024     Bring your Medication List when you go to the doctor, hospital, or   emergency room. And, share it with your family or caregivers.     Note any changes to how you take your medications.  Cross out medications when you no longer use them.    Medication How I take it Why I use it Prescriber   albuterol (PROAIR HFA/PROVENTIL HFA/VENTOLIN HFA) 108 (90 Base) MCG/ACT inhaler Inhale 2 puffs into the lungs every 6 hours Shortness of Breath Eduardo Stern MD   ARIPiprazole (ABILIFY) 20 MG tablet Take 20 mg by mouth daily .  Take with 5 mg for total of 25 mg daily Major Depressive Disorder Eduardo Stern MD   ARIPiprazole (ABILIFY) 5 MG tablet Take 5 mg by mouth daily .  Take with 20 mg for total of 25 mg daily Major Depressive Disorder Eduardo Stern MD   aspirin 81 MG tablet Take 81 mg by mouth daily Prevent Heart Attack and Stroke Eduardo Stern MD   atorvastatin (LIPITOR) 10 MG tablet Take 10 mg by mouth daily Elevation of Both Cholesterol and Triglycerides in Blood Eduardo Stern MD   buPROPion (WELLBUTRIN XL) 300 MG 24 hr tablet Take 300 mg by mouth every morning Major Depressive Disorder Eduardo Stern MD   Calcium Carbonate-Vit D-Min (CALCIUM 600+D PLUS MINERALS) 600-400 MG-UNIT TABS Take 1 tablet by mouth 2 times daily General Health Patient Reported   levomilnacipran (FETZIMA ER) 80 MG 24 hr capsule Take 80 mg by mouth daily Major Depressive Disorder Eduardo Stern MD   meloxicam (MOBIC) 15 MG tablet Take 7.5 mg by mouth 2 times daily as needed for pain Joint Damage causing Pain and Loss of Function Eduardo Stern MD   metFORMIN (GLUCOPHAGE-XR) 500 MG 24 hr tablet Take 1,000 mg by mouth 2 times daily (with meals) Type 2 Diabetes Eduardo Stern MD   methocarbamol (ROBAXIN) 500 MG tablet Take 750 mg by mouth 4 times daily as needed for muscle spasms Musculoskeletal Pain Eduardo Stern MD   montelukast (SINGULAIR) 10 MG tablet Take 10 mg by mouth At Bedtime Perennial Allergic  Rhinitis Eduardo Stern MD   Multiple Vitamin (MULTIVITAMIN ADULT PO) Take 1 tablet by mouth daily General Health Patient Reported   tirzepatide (MOUNJARO) 12.5 MG/0.5ML pen Inject 12.5 mg Subcutaneous every 7 days Type 2 diabetes mellitus without complication, without long-term current use of insulin (H) Eduardo Stern MD   traMADol (ULTRAM) 50 MG tablet Take 50 mg by mouth every 6 hours as needed for severe pain Pain Eduardo Stern MD   traZODone (DESYREL) 100 MG tablet Take 100 mg by mouth At Bedtime Trouble Sleeping Eduardo Stern MD         Add new medications, over-the-counter drugs, herbals, vitamins, or  minerals in the blank rows below.    Medication How I take it Why I use it Prescriber                                      Allergies:      - Lamictal [lamotrigine] - Other (See Comments)  - Septra [sulfamethoxazole-trimethoprim] - Difficulty breathing  - Sulfa Antibiotics - Rash        Side effects I have had:      Not on File        Other Information:              My notes and questions:

## 2024-06-12 RX ORDER — BLOOD-GLUCOSE SENSOR
EACH MISCELLANEOUS
COMMUNITY

## 2024-06-12 RX ORDER — TRAMADOL HYDROCHLORIDE 50 MG/1
50 TABLET ORAL EVERY 6 HOURS PRN
COMMUNITY

## 2024-07-22 ENCOUNTER — OFFICE VISIT (OUTPATIENT)
Dept: PHARMACY | Facility: PHYSICIAN GROUP | Age: 59
End: 2024-07-22
Payer: COMMERCIAL

## 2024-07-22 VITALS
BODY MASS INDEX: 31.98 KG/M2 | SYSTOLIC BLOOD PRESSURE: 118 MMHG | DIASTOLIC BLOOD PRESSURE: 78 MMHG | WEIGHT: 192.2 LBS | OXYGEN SATURATION: 96 % | HEART RATE: 110 BPM

## 2024-07-22 DIAGNOSIS — J45.20 INTERMITTENT ASTHMA, UNSPECIFIED ASTHMA SEVERITY, UNSPECIFIED WHETHER COMPLICATED: ICD-10-CM

## 2024-07-22 DIAGNOSIS — G47.00 INSOMNIA, UNSPECIFIED TYPE: ICD-10-CM

## 2024-07-22 DIAGNOSIS — R21 RASH: ICD-10-CM

## 2024-07-22 DIAGNOSIS — Z78.9 TAKES DIETARY SUPPLEMENTS: ICD-10-CM

## 2024-07-22 DIAGNOSIS — F32.A DEPRESSION, UNSPECIFIED DEPRESSION TYPE: ICD-10-CM

## 2024-07-22 DIAGNOSIS — R80.9 MICROALBUMINURIA: ICD-10-CM

## 2024-07-22 DIAGNOSIS — E11.9 TYPE 2 DIABETES MELLITUS WITHOUT COMPLICATION, WITHOUT LONG-TERM CURRENT USE OF INSULIN (H): Primary | ICD-10-CM

## 2024-07-22 DIAGNOSIS — F25.1 SCHIZOAFFECTIVE DISORDER, DEPRESSIVE TYPE (H): ICD-10-CM

## 2024-07-22 DIAGNOSIS — G89.4 CHRONIC PAIN SYNDROME: ICD-10-CM

## 2024-07-22 DIAGNOSIS — K21.9 GASTROESOPHAGEAL REFLUX DISEASE WITHOUT ESOPHAGITIS: ICD-10-CM

## 2024-07-22 PROCEDURE — 99607 MTMS BY PHARM ADDL 15 MIN: CPT | Performed by: PHARMACIST

## 2024-07-22 PROCEDURE — 99606 MTMS BY PHARM EST 15 MIN: CPT | Performed by: PHARMACIST

## 2024-07-22 RX ORDER — TRIAMCINOLONE ACETONIDE 1 MG/G
OINTMENT TOPICAL 2 TIMES DAILY
COMMUNITY

## 2024-07-22 RX ORDER — OMEPRAZOLE 40 MG/1
40 CAPSULE, DELAYED RELEASE ORAL DAILY
COMMUNITY

## 2024-07-22 RX ORDER — NYSTATIN 100000 [USP'U]/G
POWDER TOPICAL DAILY
COMMUNITY

## 2024-07-22 RX ORDER — TIRZEPATIDE 15 MG/.5ML
15 INJECTION, SOLUTION SUBCUTANEOUS
COMMUNITY

## 2024-07-22 NOTE — PROGRESS NOTES
Medication Therapy Management (MTM) Encounter    ASSESSMENT:                            Medication Adherence/Access: see below    Diabetes: Patient is not meeting A1c goal of < 7%.  Very close to goal time in range >70%. May benefit from further titration of Mounjaro for weight loss.  Discussed that 15 mg is max dose of Mounjaro, encouraged patient to continue to work on exercise to aid with weight loss. Discussed continuing to limit sugary drinks, though congratulated patient on progress thus far.      Hypertension/microalbuminuria:  Blood pressure at goal <130/80 mmHg today, continue current medications. Due for updated UACR    Hyperlipidemia:   Stable on moderate intensity statin, last LDL at goal <70 mg/dL    Depression/schizoaffective disorder/Insomnia:    Stable continue as directed by psychiatry    GERD    stable    Asthma   stable    Supplements   stable    Pain:  stable    Topicals  Advised patient to discuss rash with PCP at next appointment for further assessment     PLAN:                            Increase Mounjaro to 15 mg once weekly   Keep working on 7195-1855 steps     Medication issues to be addressed at a future visit:   -Podiatry referral  -Discuss menopause with Dr Stern  -Recheck rash    Follow-up: Dr Stern in August for A1c    Sep 23, 2024 10:30 AM  Pharmacist Visit with Dulce Hanna [facility] ([facility] ) [phone]          SUBJECTIVE/OBJECTIVE:                          Jodi Lacy is a 58 year old female coming in for a follow up     Reason for visit: follow up medication review    Allergies/ADRs: Reviewed in chart  Past Medical History: Reviewed in chart  Tobacco: She reports that she has been smoking vaping device and cigarettes. She does not have any smokeless tobacco history on file.Nicotine/Tobacco Cessation Plan: vaping 6-7 times per day Information offered: Patient not interested at this  time  Alcohol: 2 glasses of wine occasionally - for special occasions     Medication Adherence/Access: no issues reported. Her pillbox goes off at 10AM for her to take her medicines     Diabetes   - Mounjaro 12.5 mg weekly Tuesday or Wednesday (increased 6/10)  - Metformin ER 1000 mg twice daily    Did have nausea one of the first mornings she increased the dose, nothing since.   Reports nocturia is getting better, only 2-3 times per night.   Blood Sugar Ranges (per ENDOGENX 3 continuous glucose monitor):   Report Period: 06/25/2024 - 07/22/2024 (28 days)  % Time CGM Active: 99%  Glucose Statistics and Targets  Average Glucose: 174 mg/dL  Time in Ranges  Very High: >250 mg/dL --- 5%  High: 181 - 250 mg/dL --- 32%  Target Range: 70 - 180 mg/dL --- 63%  Low: 54 - 69 mg/dL --- 0%  Very Low: <54 mg/dL --- 0%  Symptoms of low blood sugar? shaky, dizzy, weak.  Does not recall last time this occurred  Symptoms of high blood sugar? polyuria  Diet/Exercise: Does not like taste of alves/fried foods as much as before   Still struggling with sugary drinks -likes sweetened Monster better than diet Monster  Does like sugar free candy, eating more fruits/veggies  Working on exercise - 1485-1123 steps a day - goal to get to 1579-0453 steps per day. Exercise limited by hot weather/hot flashes  Microalbumin: Due, within normal limits last time it was checked     Eye exam in the last 12 months? No due - [facility]   Foot exam: up to date- needs toenail clipped     Hypertension /Microalbuminuria   -Lisinopril 5 mg daily  Patient reports no current medication side effects  Patient does not self-monitor blood pressure.         Hyperlipidemia   - Atorvastatin 10 mg daily  Patient reports no significant myalgias or other side effects that she thinks are related to her statin.   The 10-year ASCVD risk score (Caitie JARA, et al., 2019) is: 6.4%    Values used to calculate the score:      Age: 58 years      Sex: Female      Is  Non- : No      Diabetic: Yes      Tobacco smoker: Yes      Systolic Blood Pressure: 118 mmHg      Is BP treated: No      HDL Cholesterol: 51 mg/dL      Total Cholesterol: 141 mg/dL       Mental Health   Depression/schizoaffective disorder/Insomnia:    - Abilify 20 mg daily (decreased about a month ago)  - Seroquel 25-50 mg at bedtime - not taking   - Trazodone 100 mg daily- rarely using   - Fetzima 80 mg daily  - Bupropion  mg daily  Patient denies issues with sleep.  Has not noticed a change since Abilify dose decrease    Feels her mood has been doing well overall and feels these medications have been effective.  Has not noticed any side effects with them.  Follows with psychiatrist, Dr. Angie Blanchard.       GERD    -Omeprazole 40 mg once daily   Patient feels that current regimen is effective. No side effects noted       Asthma   -Montelukast 10 mg daily  -Ventolin HFA inhaler  Patient reports no current medication side effects.      Triggers include: exercise or sports.  Patient reports the following symptoms: none.       Supplements   - Calcium-vitamin D 600-400 mg-units twice daily  - Multivitamin daily  No concerns at this time, prefers to continue multivitamin.     Pain:  -Meloxicam 15 mg, 1/2 tablet twice daily - this is helping her osteoarthritis   -Tramadol 50 mg daily as needed- hardly using   -Methocarbamol 750 mg four times daily as needed for spasms   Had bone on bone arthritis in her hands, they are considering surgery.  She tries to limit how often she takes tramadol, doesn't want to become dependant.      Topicals  -Triamcinolone ointment twice daily   -Nystatin powder  She has a rash under her breasts, would like more nsystatin powder    /78   Pulse 110   Wt 192 lb 3.2 oz (87.2 kg)   SpO2 96%   BMI 31.98 kg/m    ----------------    I spent 35 minutes with this patient today. All changes were made via collaborative practice agreement with Eduardo Stern MD. A copy  of the visit note was provided to the patient's provider(s).    A summary of these recommendations was declined by the patient.    Dulce Hanna, Pharm.D.  Medication Therapy Management Pharmacist     Medication Therapy Recommendations  Type 2 diabetes mellitus without complication, without long-term current use of insulin (H)    Current Medication: tirzepatide (MOUNJARO) 12.5 MG/0.5ML pen (Discontinued)   Rationale: Dose too low - Dosage too low - Effectiveness   Recommendation: Increase Dose - Mounjaro 15 MG/0.5ML Sopn   Status: Accepted per CPA

## 2024-11-04 ENCOUNTER — OFFICE VISIT (OUTPATIENT)
Dept: PHARMACY | Facility: PHYSICIAN GROUP | Age: 59
End: 2024-11-04
Payer: COMMERCIAL

## 2024-11-04 VITALS — WEIGHT: 187.2 LBS | DIASTOLIC BLOOD PRESSURE: 74 MMHG | BODY MASS INDEX: 31.15 KG/M2 | SYSTOLIC BLOOD PRESSURE: 132 MMHG

## 2024-11-04 DIAGNOSIS — F17.200 NICOTINE DEPENDENCE, UNCOMPLICATED, UNSPECIFIED NICOTINE PRODUCT TYPE: ICD-10-CM

## 2024-11-04 DIAGNOSIS — R09.81 NASAL CONGESTION: ICD-10-CM

## 2024-11-04 DIAGNOSIS — E11.9 TYPE 2 DIABETES MELLITUS WITHOUT COMPLICATION, WITHOUT LONG-TERM CURRENT USE OF INSULIN (H): Primary | ICD-10-CM

## 2024-11-04 DIAGNOSIS — J45.20 INTERMITTENT ASTHMA, UNSPECIFIED ASTHMA SEVERITY, UNSPECIFIED WHETHER COMPLICATED: ICD-10-CM

## 2024-11-04 DIAGNOSIS — F32.A DEPRESSION, UNSPECIFIED DEPRESSION TYPE: ICD-10-CM

## 2024-11-04 DIAGNOSIS — R80.9 MICROALBUMINURIA: ICD-10-CM

## 2024-11-04 PROCEDURE — 99607 MTMS BY PHARM ADDL 15 MIN: CPT | Performed by: PHARMACIST

## 2024-11-04 PROCEDURE — 99606 MTMS BY PHARM EST 15 MIN: CPT | Performed by: PHARMACIST

## 2024-11-04 NOTE — PROGRESS NOTES
Medication Therapy Management (MTM) Encounter    ASSESSMENT:                            Smoking cessation:   Encouraged patient to think about quitting vaping given all of her colds recently. Still in contemplation/preparation stage.     Medication Adherence/Access: no issues     Cold symptoms  Scheduled same day to discuss this per patient request     Diabetes: Patient is not meeting A1c goal of < 7%. Discussed that 15 mg is max dose of Mounjaro, encouraged patient to continue to work on exercise to aid with weight loss. Discussed eating well, even when feeling sick. May need to add back in glipizide if patient is not able to control blood sugar with dietary modifications- declines changes today given cold symptoms      Hypertension/microalbuminuria:  Blood pressure at goal <130/80 mmHg today, continue current medications. Due for updated UACR    Hyperlipidemia:   Stable on moderate intensity statin, last LDL at goal <70 mg/dL    Depression/schizoaffective disorder/Insomnia:    Continue as directed by psychiatry. Recommended continual follow up with therapist given family issues.     Asthma   Stable- encouraged follow up with provider about breathing     PLAN:                            Quitting smoking/vaping would help with your breathing and congestion.   Continue to work on watching what you are eating. Get plenty of rest and fluids.   3.   Switch to Freestyle mitzi 3 plus sensors to see if they stick longer  You can cover your sensors with a bandaid to help prevent them from getting pulled off     Follow-up:   Appointments in Next Year      Dec 19, 2024 11:00 AM  Pharmacist Visit with Dulce Hanna [facility] ([facility] ) [phone]       SUBJECTIVE/OBJECTIVE:                          Jodi Lacy is a 58 year old female coming in for a follow up     Reason for visit: follow up medication review    Allergies/ADRs: Reviewed in  chart  Past Medical History: Reviewed in chart  Alcohol: 2 glasses of wine occasionally - for special occasions   Tobacco: She reports that she has been smoking vaping device and cigarettes. She does not have any smokeless tobacco history on file.Nicotine/Tobacco Cessation Plan:   Has previously tried to quit, sometimes only lasting 1 week, sometimes last 1 month. Vaping is causing a worse cough than smoking did. Would like to use lozenges, not gum  Vaping as soon as she wakes up in the morning. Vaped 5x yesterday, 2 this morning already   Does have nicotine lozenges at home but has not started to use them yet- still thinking about if she is ready to quit vaping, getting closer.     Medication Adherence/Access: no issues reported. Her pillbox goes off at 10AM for her to take her medicines     Cold symptoms  Congested still today- did get slightly better after seeing Dr Carbone 10/18 but is now feeling crummy again. Had nausea yesterday     Diabetes   - Mounjaro 15 mg weekly on Saturday/Sunday  - Metformin ER 1000 mg twice daily  No other side effects. Vomited yesterday afternoon- had leftovers from [employer] yesterday morning - is not sure what caused it   Blood Sugar Ranges (per Desiree 3 continuous glucose monitor):   Report Period: 10/08/2024 - 11/04/2024 (28 days)  Average Glucose: 206 mg/dL  Glucose Management Indicator (GMI): 8.2%  Time in Ranges  Very High: >250 mg/dL --- 23%  High: 181 - 250 mg/dL --- 34%  Target Range: 70 - 180 mg/dL --- 43%  Low: 54 - 69 mg/dL --- 0%  Very Low: <54 mg/dL --- 0%  Symptoms of low blood sugar? shaky, dizzy, weak.  Does not recall last time this occurred  Symptoms of high blood sugar? polyuria  Sensors are only lasting 9 days - usually getting caught on clothes  Diet/Exercise:   Working on exercise - goal to get to 6000 steps per day. Exercise limited by cold symptoms the last 4-6 weeks. Been eating less because she is feeling crummy  Microalbumin: Due, within normal limits last  time it was checked  Last A1c 7.8% (8/20/24)     Eye exam in the last 12 months? No due - [facility] [place]     Hypertension /Microalbuminuria   -Lisinopril 5 mg daily  Patient reports no current medication side effects  Patient does not self-monitor blood pressure.         Hyperlipidemia   - Atorvastatin 10 mg daily  Patient reports no significant myalgias or other side effects that she thinks are related to her statin.   The 10-year ASCVD risk score (Caitie DK, et al., 2019) is: 6.5%    Values used to calculate the score:      Age: 59 years      Sex: Female      Is Non- : No      Diabetic: Yes      Tobacco smoker: Yes      Systolic Blood Pressure: 114 mmHg      Is BP treated: No      HDL Cholesterol: 51 mg/dL      Total Cholesterol: 141 mg/dL       Mental Health   Depression/schizoaffective disorder/Insomnia:    - Abilify 20 mg daily  - Seroquel 25-50 mg at bedtime - not taking   - Trazodone 100 mg daily- rarely using   - Fetzima 80 mg daily  - Bupropion  mg daily  Patient denies issues with sleep. Stressed with her son's marriage problems. No medication changes recently, following up with therapist tomorrow  Has not noticed any side effects with medications. Follows with psychiatrist, Dr. Angie Blanchard.       Asthma   -Montelukast 10 mg daily  -Ventolin HFA inhaler  Patient reports no current medication side effects.      Triggers include: exercise or sports. Been using Ventolin more and more recently with her cold symptoms.   Patient reports the following symptoms: none.       Wt 187 lb 3.2 oz (84.9 kg)   BMI 31.15 kg/m    ----------------    I spent 17 minutes with this patient today. All changes were made via collaborative practice agreement with Eduardo Stern MD. A copy of the visit note was provided to the patient's provider(s).    A summary of these recommendations was declined by the patient.    Dulce Hanna, Pharm.D.  Medication Therapy Management Pharmacist      Medication Therapy Recommendations  Type 2 diabetes mellitus without complication, without long-term current use of insulin (H)   1 Current Medication: Continuous Glucose Sensor (FREESTYLE ELAN 3 SENSOR) MISC   Rationale: More effective medication available - Ineffective medication - Effectiveness   Recommendation: Change Medication   Status: Accepted per CPA   Identified Date: 11/4/2024 Completed Date: 11/4/2024

## 2024-12-02 ENCOUNTER — TRANSFERRED RECORDS (OUTPATIENT)
Dept: MULTI SPECIALTY CLINIC | Facility: CLINIC | Age: 59
End: 2024-12-02

## 2024-12-02 LAB — RETINOPATHY: NORMAL

## 2025-01-02 ENCOUNTER — OFFICE VISIT (OUTPATIENT)
Dept: PHARMACY | Facility: PHYSICIAN GROUP | Age: 60
End: 2025-01-02
Payer: COMMERCIAL

## 2025-01-02 VITALS — WEIGHT: 191 LBS | DIASTOLIC BLOOD PRESSURE: 76 MMHG | BODY MASS INDEX: 31.78 KG/M2 | SYSTOLIC BLOOD PRESSURE: 128 MMHG

## 2025-01-02 DIAGNOSIS — J45.20 INTERMITTENT ASTHMA, UNSPECIFIED ASTHMA SEVERITY, UNSPECIFIED WHETHER COMPLICATED: ICD-10-CM

## 2025-01-02 DIAGNOSIS — G47.00 INSOMNIA, UNSPECIFIED TYPE: ICD-10-CM

## 2025-01-02 DIAGNOSIS — F25.1 SCHIZOAFFECTIVE DISORDER, DEPRESSIVE TYPE (H): ICD-10-CM

## 2025-01-02 DIAGNOSIS — F17.200 NICOTINE DEPENDENCE, UNCOMPLICATED, UNSPECIFIED NICOTINE PRODUCT TYPE: ICD-10-CM

## 2025-01-02 DIAGNOSIS — R80.9 MICROALBUMINURIA: ICD-10-CM

## 2025-01-02 DIAGNOSIS — E11.9 TYPE 2 DIABETES MELLITUS TREATED WITHOUT INSULIN (H): Primary | ICD-10-CM

## 2025-01-02 DIAGNOSIS — F32.A DEPRESSION, UNSPECIFIED DEPRESSION TYPE: ICD-10-CM

## 2025-01-02 DIAGNOSIS — G89.4 CHRONIC PAIN SYNDROME: ICD-10-CM

## 2025-01-02 DIAGNOSIS — K21.9 GASTROESOPHAGEAL REFLUX DISEASE WITHOUT ESOPHAGITIS: ICD-10-CM

## 2025-01-02 DIAGNOSIS — E78.5 HYPERLIPIDEMIA LDL GOAL <70: ICD-10-CM

## 2025-01-02 RX ORDER — LISINOPRIL 5 MG/1
5 TABLET ORAL DAILY
COMMUNITY

## 2025-01-02 RX ORDER — FENOFIBRATE 54 MG/1
54 TABLET ORAL DAILY
COMMUNITY

## 2025-01-02 RX ORDER — QUETIAPINE FUMARATE 25 MG/1
75 TABLET, FILM COATED ORAL DAILY
COMMUNITY

## 2025-01-02 NOTE — LETTER
January 2, 2025  Jodi Lacy  [address]   [place] [address]    Dear Ms. Lacy, [facility]     Thank you for talking with me on Jan 2, 2025 about your health and medications. As a follow-up to our conversation, I have included two documents:      Your Recommended To-Do List has steps you should take to get the best results from your medications.  Your Medication List will help you keep track of your medications and how to take them.    If you want to talk about these documents, please call Dulce Hanna RPH at phone: [phone], Monday-Friday 8-4:30pm.    I look forward to working with you and your doctors to make sure your medications work well for you.    Sincerely,  Dulce Hanna RPH  [facility] Pharmacist, [facility]

## 2025-01-02 NOTE — LETTER
"Recommended To-Do List      Prepared on: Jan 2, 2025       You can get the best results from your medications by completing the items on this \"To-Do List.\"      Bring your To-Do List when you go to your doctor. And, share it with your family or caregivers.    My To-Do List:  What we talked about: What I should do:   The importance of taking your medication as intended    Education: use trazodone instead of Seroquel           What we talked about: What I should do:   A new medication that may be of benefit to you    Start taking Nicorette gum          What we talked about: What I should do:                     "

## 2025-01-02 NOTE — LETTER
_  Medication List        Prepared on: Jan 2, 2025     Bring your Medication List when you go to the doctor, hospital, or   emergency room. And, share it with your family or caregivers.     Note any changes to how you take your medications.  Cross out medications when you no longer use them.    Medication How I take it Why I use it Prescriber   albuterol (PROAIR HFA/PROVENTIL HFA/VENTOLIN HFA) 108 (90 Base) MCG/ACT inhaler Inhale 2 puffs into the lungs every 6 hours Shortness of Breath Eduardo Stern MD   ARIPiprazole (ABILIFY) 20 MG tablet Take 20 mg by mouth daily  Major Depressive Disorder Eduardo Stern MD   aspirin 81 MG tablet Take 81 mg by mouth daily Prevent Heart Attack and Stroke Eduardo Stern MD   atorvastatin (LIPITOR) 10 MG tablet Take 10 mg by mouth daily Elevation of Both Cholesterol and Triglycerides in Blood Eduardo Stern MD   buPROPion (WELLBUTRIN XL) 300 MG 24 hr tablet Take 300 mg by mouth every morning Major Depressive Disorder Eduardo Stern MD   fenofibrate (LOFIBRA) 54 MG tablet Take 54 mg by mouth daily. High Amount of Triglycerides in the Blood Eduardo Stern MD   levomilnacipran (FETZIMA ER) 80 MG 24 hr capsule Take 80 mg by mouth daily Major Depressive Disorder Eduardo Stern MD   lisinopril (ZESTRIL) 5 MG tablet Take 5 mg by mouth daily. High Blood Pressure Eduardo Stern MD   meloxicam (MOBIC) 15 MG tablet Take 7.5 mg by mouth 2 times daily as needed for pain Joint Damage causing Pain and Loss of Function Eduardo Stern MD   metFORMIN (GLUCOPHAGE-XR) 500 MG 24 hr tablet Take 1,000 mg by mouth 2 times daily (with meals) Type 2 Diabetes Eduardo Stern MD   methocarbamol (ROBAXIN) 500 MG tablet Take 750 mg by mouth 4 times daily as needed for muscle spasms Musculoskeletal Pain Eduardo Stern MD   montelukast (SINGULAIR) 10 MG tablet Take 10 mg by mouth At Bedtime Perennial Allergic Rhinitis Eduardo Stern MD   Multiple Vitamin (MULTIVITAMIN ADULT PO) Take 1 tablet by mouth daily General Health Self    nicotine (NICORETTE) 4 MG lozenge Place 4 mg inside cheek every hour as needed for nicotine withdrawal symptoms. Nicotine Addiction Eduardo Stern MD   omeprazole (PRILOSEC) 40 MG DR capsule Take 40 mg by mouth daily. Gastroesophageal Reflux Disease Eduardo Stern MD   QUEtiapine (SEROQUEL) 25 MG tablet Take 75 mg by mouth daily. Trouble Sleeping Eduardo Stern MD   tirzepatide (MOUNJARO) 15 MG/0.5ML pen Inject 15 mg subcutaneously every 7 days. type 2 diabetes Eduardo Stern MD   traMADol (ULTRAM) 50 MG tablet Take 50 mg by mouth every 6 hours as needed for severe pain Pain Eduardo Stern MD   traZODone (DESYREL) 100 MG tablet Take 100 mg by mouth At Bedtime Trouble Sleeping Eduardo Stern MD         Add new medications, over-the-counter drugs, herbals, vitamins, or  minerals in the blank rows below.    Medication How I take it Why I use it Prescriber                                      Allergies:      - Lamictal [lamotrigine] - Other (See Comments)  - Septra [sulfamethoxazole-trimethoprim] - Difficulty breathing  - Sulfa Antibiotics - Rash        Side effects I have had:      Not on File        Other Information:              My notes and questions:

## 2025-01-02 NOTE — PATIENT INSTRUCTIONS
"Recommendations from today's MTM visit:                                                      Work on weaning cigarettes down to 5 per day  Watching what you eat and exercising are the best tools you can use to get A1c <7%.   Goal Time in Range = 70%  Try trazodone instead of Seroquel - this may help with weight     Follow-up: March 24th at 10AM     It was great speaking with you today.  I value your experience and would be very thankful for your time in providing feedback in our clinic survey. In the next few days, you may receive an email or text message from [employer] with a link to a survey related to your  clinical pharmacist.\"     To schedule another MTM appointment, please call the clinic directly or you may call [phone]    My Clinical Pharmacist's contact information:                                                      Please feel free to contact me with any questions or concerns you have. Use the patient portal - address to your provider with subject line \"to Dulce\" or use [employer] to send me a message.      Dulce Hanna, Pharm.D.  Medication Therapy Management Pharmacist  [facility]  "

## 2025-01-02 NOTE — PROGRESS NOTES
Medication Therapy Management (MTM) Encounter    ASSESSMENT:                            Smoking cessation:   Encouraged patient to wean smoking as she is not ready to quit entirely. Would benefit from NRT gum to help with cravings     Medication Adherence/Access: I am confident patient has switched to Freestyle Desiree 3 plus sensors based on dispense reports, encouraged her it is the same application just a different box color.     Diabetes: Patient is not meeting A1c goal of < 7%. encouraged patient to continue to work on exercise to aid with weight loss. Reviewed blood sugar goals. At Time in Range goal >70%.      Hypertension/microalbuminuria:  Blood pressure at goal <130/80 mmHg today, continue current medications.     Hyperlipidemia:   Stable on moderate intensity statin, last LDL at goal <70 mg/dL. Trigs at goal <500, may not need fibrate in future with diet control, though this is prescribed by psych     Depression/schizoaffective disorder/Insomnia:    Continue as directed by psychiatry. Recommended continual follow up with therapist   Encouraged use of trazodone if possible, as this is less likely to increase cholesterol/cause weight gain than quetiapine.     Asthma   Stable- would benefit from albuterol refill     GERD    Stable on current therapy. Could consider de-esclating to H2RA in the future. Chronic PPI use places patient at an increased risk of C. Diff, hypomagnesemia and lower bone mineral density, will review these risks next visit     Pain:  Stable     PLAN:                            Work on weaning cigarettes down to 5 per day  Watching what you eat and exercising are the best tools you can use to get A1c <7%.   Goal Time in Range = 70%  Try trazodone instead of Seroquel     Follow-up: 3/24 with Dulce     SUBJECTIVE/OBJECTIVE:                          Jodi Lacy is a 59 year old female coming in for a follow up     Reason for visit: follow up medication review    Allergies/ADRs: Reviewed  in chart  Past Medical History: Reviewed in chart  Alcohol: 2 glasses of wine occasionally - for special occasions   Tobacco: She reports that she has been smoking cigarettes. She does not have any smokeless tobacco history on file.Nicotine/Tobacco Cessation Plan:   Under half a pack a day. 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 Her pillbox goes off at 10AM for her to take her medicines   Would like 3 plus sensors at [facility]- has not been able to get them from [employer], per dispense reports is getting desiree 3 plus sensors from [facility]. Does not remember if her box is white or yellow.     Diabetes   - Mounjaro 15 mg weekly on Saturday/Sunday- has 1 left   - Metformin ER 1000 mg twice daily  No side effects.   Symptoms of low blood sugar? shaky, dizzy, weak.  Did have a low blood sugar on desiree a few weeks ago, thinks it was a false low as she did not have symptoms   Symptoms of high blood sugar? polyuria  Diet/Exercise: working on portion control  Does have a treadmill across the street that she hopes to use more in the winter. Has PCA worker to encourage her.   Buying sugar free energy drinks   Black coffee no sugar, very motivated to get close to 7% for first time since her diagnosis     Working on exercise - goal to get to 6000 steps per day.   Microalbumin: normal  Last A1c 8.2% (11/18/24)     Blood Sugar Ranges (per Desiree 3 continuous glucose monitor):   Report Period: 12/06/2024 - 01/02/2025 (28 days)  Average Glucose: 158 mg/dL  Glucose Management Indicator (GMI): 7.1%  Glucose Variability (%CV): 29.3%  Time in Ranges  Very High: >250 mg/dL --- 5%  High: 181 - 250 mg/dL --- 21%  Target Range: 70 - 180 mg/dL ---  74%  Low: 54 - 69 mg/dL --- 0%  Very Low: <54 mg/dL --- 0%  Eye exam in the last 12 months? No due - [facility] eye clinic in [place]     Hypertension /Microalbuminuria   -Lisinopril 5 mg daily   Patient reports no current medication side effects  Patient does not self-monitor blood pressure.         Hyperlipidemia   - Atorvastatin 10 mg daily  -fenofibrate 54 mg daily   Patient reports no significant myalgias or other side effects that she thinks are related to her statin.   The 10-year ASCVD risk score (Caitie JARA, et al., 2019) is: 8.6%    Values used to calculate the score:      Age: 59 years      Sex: Female      Is Non- : No      Diabetic: Yes      Tobacco smoker: Yes      Systolic Blood Pressure: 132 mmHg      Is BP treated: No      HDL Cholesterol: 51 mg/dL      Total Cholesterol: 141 mg/dL         Mental Health   Depression/schizoaffective disorder/Insomnia:    - Abilify 20 mg daily  - Seroquel 50-75 mg at bedtime   - Trazodone 100 mg daily  - Fetzima (levomilnacipran) 80 mg daily  - Bupropion  mg daily  Switching back and forth between 3 tablets of quetiapine and trazodone for sleep. Feels extra groggy with quetiapine.     Patient denies issues with sleep. Stressed with her son's marriage/work problems. No medication changes recently, following up with therapist regularly  Has not noticed any side effects with medications. Follows with psychiatrist       Asthma   -Montelukast 10 mg daily  -Ventolin HFA inhaler  Patient reports no current medication side effects.      Triggers include: exercise or sports. Been using Ventolin more and more recently with her cold symptoms. Needs refill of inhaler   Cutting back on smoking- quit vaping and feels like her breathing is improving  Patient reports the following symptoms: none.       GERD    -Omeprazole 40 mg once daily   Patient feels that current regimen is effective. No side effects noted  Peanut butter gives her heartburn         Pain:  -Meloxicam 15 mg, 1/2 tablet twice daily - this is helping her osteoarthritis   -Tramadol 50 mg daily as needed  -Methocarbamol 750 mg four times daily as needed for spasms   Thumb is doing well after cortisone injection  Back is doing better than this time last year  - has not needed injection yet  Tramadol not taking  Methocarbamol not taking much - maybe 1 per month     There were no vitals taken for this visit.  ----------------    I spent 35 minutes with this patient today. All changes were made via collaborative practice agreement with Eduardo Stern MD. A copy of the visit note was provided to the patient's provider(s).    A summary of these recommendations was mailed to the patient.    Dulce Hanna, Pharm.D.  Medication Therapy Management Pharmacist     Medication Therapy Recommendations  Insomnia, unspecified type   1 Current Medication: traZODone (DESYREL) 100 MG tablet   Current Medication Sig: Take 100 mg by mouth At Bedtime   Rationale: Does not understand instructions - Adherence - Adherence   Recommendation: Provide Education   Status: Patient Agreed - Adherence/Education   Identified Date: 1/2/2025 Completed Date: 1/2/2025         Nicotine dependence, uncomplicated, unspecified nicotine product type   1 Rationale: Untreated condition - Needs additional medication therapy - Indication   Recommendation: Start Medication - Nicorette 4 MG Gum   Status: Accepted per CPA   Identified Date: 1/2/2025 Completed Date: 1/2/2025

## 2025-02-07 ENCOUNTER — LAB REQUISITION (OUTPATIENT)
Dept: LAB | Facility: CLINIC | Age: 60
End: 2025-02-07
Payer: COMMERCIAL

## 2025-02-07 DIAGNOSIS — R35.0 FREQUENCY OF MICTURITION: ICD-10-CM

## 2025-02-07 DIAGNOSIS — Z12.4 ENCOUNTER FOR SCREENING FOR MALIGNANT NEOPLASM OF CERVIX: ICD-10-CM

## 2025-02-07 PROCEDURE — G0145 SCR C/V CYTO,THINLAYER,RESCR: HCPCS

## 2025-02-07 PROCEDURE — 87624 HPV HI-RISK TYP POOLED RSLT: CPT

## 2025-02-07 PROCEDURE — 87086 URINE CULTURE/COLONY COUNT: CPT

## 2025-02-07 PROCEDURE — G0145 SCR C/V CYTO,THINLAYER,RESCR: HCPCS | Mod: ORL

## 2025-02-07 PROCEDURE — 87624 HPV HI-RISK TYP POOLED RSLT: CPT | Mod: ORL

## 2025-02-07 PROCEDURE — 87086 URINE CULTURE/COLONY COUNT: CPT | Mod: ORL

## 2025-02-08 LAB — BACTERIA UR CULT: NORMAL

## 2025-02-10 LAB
HPV HR 12 DNA CVX QL NAA+PROBE: NEGATIVE
HPV16 DNA CVX QL NAA+PROBE: NEGATIVE
HPV18 DNA CVX QL NAA+PROBE: NEGATIVE
HUMAN PAPILLOMA VIRUS FINAL DIAGNOSIS: NORMAL

## 2025-02-12 LAB
BKR AP ASSOCIATED HPV REPORT: NORMAL
BKR LAB AP GYN ADEQUACY: NORMAL
BKR LAB AP GYN INTERPRETATION: NORMAL
BKR LAB AP LMP: NORMAL
BKR LAB AP PREVIOUS ABNL DX: NORMAL
BKR LAB AP PREVIOUS ABNORMAL: NORMAL
PATH REPORT.COMMENTS IMP SPEC: NORMAL
PATH REPORT.COMMENTS IMP SPEC: NORMAL
PATH REPORT.RELEVANT HX SPEC: NORMAL

## 2025-02-17 ENCOUNTER — TRANSFERRED RECORDS (OUTPATIENT)
Dept: HEALTH INFORMATION MANAGEMENT | Facility: CLINIC | Age: 60
End: 2025-02-17

## 2025-02-17 LAB — HBA1C MFR BLD: 7.6 % (ref 4.2–6.1)

## 2025-03-17 PROCEDURE — 87088 URINE BACTERIA CULTURE: CPT | Mod: ORL

## 2025-03-17 PROCEDURE — 87088 URINE BACTERIA CULTURE: CPT

## 2025-03-18 ENCOUNTER — LAB REQUISITION (OUTPATIENT)
Dept: LAB | Facility: CLINIC | Age: 60
End: 2025-03-18
Payer: COMMERCIAL

## 2025-03-18 DIAGNOSIS — R35.0 FREQUENCY OF MICTURITION: ICD-10-CM

## 2025-03-18 DIAGNOSIS — R53.83 OTHER FATIGUE: ICD-10-CM

## 2025-03-19 LAB — BACTERIA UR CULT: ABNORMAL

## 2025-03-24 ENCOUNTER — OFFICE VISIT (OUTPATIENT)
Dept: PHARMACY | Facility: PHYSICIAN GROUP | Age: 60
End: 2025-03-24
Payer: COMMERCIAL

## 2025-03-24 VITALS — SYSTOLIC BLOOD PRESSURE: 118 MMHG | DIASTOLIC BLOOD PRESSURE: 76 MMHG | BODY MASS INDEX: 30.95 KG/M2 | WEIGHT: 186 LBS

## 2025-03-24 DIAGNOSIS — F17.200 NICOTINE DEPENDENCE, UNCOMPLICATED, UNSPECIFIED NICOTINE PRODUCT TYPE: ICD-10-CM

## 2025-03-24 DIAGNOSIS — R52 PAIN: ICD-10-CM

## 2025-03-24 DIAGNOSIS — N30.00 ACUTE CYSTITIS WITHOUT HEMATURIA: ICD-10-CM

## 2025-03-24 DIAGNOSIS — R80.9 MICROALBUMINURIA: ICD-10-CM

## 2025-03-24 DIAGNOSIS — E78.5 HYPERLIPIDEMIA LDL GOAL <70: ICD-10-CM

## 2025-03-24 DIAGNOSIS — E11.9 TYPE 2 DIABETES MELLITUS TREATED WITHOUT INSULIN (H): Primary | ICD-10-CM

## 2025-03-24 PROCEDURE — 3074F SYST BP LT 130 MM HG: CPT | Performed by: PHARMACIST

## 2025-03-24 PROCEDURE — 3078F DIAST BP <80 MM HG: CPT | Performed by: PHARMACIST

## 2025-03-24 PROCEDURE — 99606 MTMS BY PHARM EST 15 MIN: CPT | Performed by: PHARMACIST

## 2025-03-24 PROCEDURE — 99607 MTMS BY PHARM ADDL 15 MIN: CPT | Performed by: PHARMACIST

## 2025-03-24 NOTE — PROGRESS NOTES
Medication Therapy Management (MTM) Encounter    ASSESSMENT:                            Smoking cessation:   Encouraged patient to wean smoking if she is not ready to quit entirely. Would benefit from NRT patch and gum to help with cravings. Quit attempts are more successful when using both.     Medication Adherence/Access: no issues    UTI   Encouraged continuing antibiotic with food, keep appointment with provider next week for follow up     Diabetes: Patient is not meeting A1c goal of < 7% but is less than <8%! encouraged patient to continue to work on exercise to aid with weight loss. Encouraged continuing to cut out sugar beverages. Reviewed blood sugar goals. At Time in Range goal >70%.      Hypertension/microalbuminuria:  Blood pressure at goal <130/80 mmHg today, continue current medications.     Hyperlipidemia:   Stable on moderate intensity statin, last LDL at goal <70 mg/dL. Trigs at goal <500, may not need fibrate in future with diet and blood sugar control    Pain:  Stable     PLAN:                            Continue Keflex as directed - take with food to help with nausea  Have bedtime snack this week to help with low blood sugar overnight- let us know if you need more blood sugar tablet refills   Try nicotine patch and gum when you are ready to quit again - using both makes you more successful than cold turkey   Watching what you eat and exercising are the best tools you can use to get A1c <7%. Keep walking- provided walking schedule   Goal Time in Range = 70%    Medication issues to be addressed at a future visit:   -Consider stopping fenofibrate    Follow-up: 6/19 with Dulce     SUBJECTIVE/OBJECTIVE:                          Jodi Lacy is a 59 year old female coming in for a follow up. Accompanied by her PCA Marisol     Reason for visit: follow up medication review    Allergies/ADRs: Reviewed in chart  Past Medical History: Reviewed in chart  Alcohol: 2 glasses of wine occasionally - for  special occasions   Tobacco: She reports that she has been smoking cigarettes. She does not have any smokeless tobacco history on file.Nicotine/Tobacco Cessation Plan:   Quit smoking in Early January or 6 weeks  Quit 4 days last week   Smoking about 6-7 per day - only in the car   Going to quit this summer for sure  Not using NRT patches, just doing gum     Medication Adherence/Access: no issues reported. Her pillbox goes off at 10AM for her to take her medicines     UTI   Only taken a few days of Keflex - causing nausea/diarrhea  Overnight Saturday night 1 low blood sugar - treated with orange juice   Overall Less tired than before but still having urinary frequency   No low blood sugars today yet    Diabetes   - Mounjaro 15 mg weekly on Saturday/Sunday- has 1 left   - Metformin ER 1000 mg twice daily    Symptoms of low blood sugar? shaky, dizzy, weak.  Did have a low blood sugar on desiree overnight     Symptoms of high blood sugar? polyuria  Diet/Exercise: working on portion control  Does have a treadmill across the street that she hopes to use more. Will also be babysitting grandkids this summer, excited for spring weather    Has PCA worker to encourage her.   Buying sugar free energy drinks   Black coffee no sugar, very motivated to get <7% for first time since her diagnosis   Does snack before bed, working evenings 3-10 pm   Cut out lemonade- drinking unsweetened tea instead  Has cut out fatty snacks   Working on exercise - goal to get to 6000 steps per day.   Microalbumin: normal  Last A1c 7.6% 2/17/25 - Abstracted A1c March 24, 2025        Blood Sugar Ranges (per Desiree 3 continuous glucose monitor):  Name: Jodi Lacy  YOB: 1965  Report Period: 02/25/2025 - 03/24/2025 (28 days)  Glucose Statistics and Targets  Average Glucose: 145 mg/dL  Glucose Management Indicator (GMI): 6.8%  Glucose Variability (%CV): 25.6%    Time in Ranges  Very High: >250 mg/dL --- 1%  High: 181 - 250 mg/dL ---  16%  Target Range: 70 - 180 mg/dL --- 83%  Low: 54 - 69 mg/dL --- 0%  Very Low: <54 mg/dL --- 0%    Eye exam in the last 12 months? Yes- Date of last eye exam: 12/2/24,  Location: [facility]    Hypertension /Microalbuminuria   -Lisinopril 5 mg daily   Patient reports no current medication side effects  Patient does not self-monitor blood pressure.         Hyperlipidemia   - Atorvastatin 10 mg daily  -fenofibrate 54 mg daily   Patient reports no significant myalgias or other side effects that she thinks are related to her statin.   The 10-year ASCVD risk score (Caitie JARA, et al., 2019) is: 7%    Values used to calculate the score:      Age: 59 years      Sex: Female      Is Non- : No      Diabetic: Yes      Tobacco smoker: Yes      Systolic Blood Pressure: 118 mmHg      Is BP treated: No      HDL Cholesterol: 51 mg/dL      Total Cholesterol: 141 mg/dL         Pain:  -Meloxicam 15 mg, 1/2 tablet twice daily - this is helping her osteoarthritis   -Methocarbamol 750 mg four times daily as needed for spasms   -pregabalin 50 mg twice daily - has stopped it for a few weeks as directed by PCP     Doing physical therapy - this is helping if she doesn't overdo   Injections on hold until after infection clears  \  /76   Wt 186 lb (84.4 kg)   BMI 30.95 kg/m    ----------------    I spent 35 minutes with this patient today. All changes were made via collaborative practice agreement with Eduardo Stern MD.     A summary of these recommendations was mailed to the patient.    Dulce Hanna, Pharm.D.  Medication Therapy Management Pharmacist     Medication Therapy Recommendations  Nicotine dependence, uncomplicated, unspecified nicotine product type   1 Current Medication: nicotine (NICORETTE) 4 MG lozenge (Discontinued)   Current Medication Sig: Place 4 mg inside cheek every hour as needed for nicotine withdrawal symptoms.   Rationale: Does not understand instructions - Adherence - Adherence    Recommendation: Provide Education   Status: Patient Agreed - Adherence/Education   Identified Date: 3/24/2025 Completed Date: 3/24/2025

## 2025-03-24 NOTE — PATIENT INSTRUCTIONS
"Recommendations from today's MTM visit:                                                      Continue Keflex as directed - take with food to help with nausea  Have bedtime snack this week to help with low blood sugar overnight- let us know if you need more blood sugar tablet refills   Try nicotine patch and gum when you are ready to quit again - using both makes you more successful than cold turkey   Watching what you eat and exercising are the best tools you can use to get A1c <7%. Keep walking!    Goal Time in Range = 70%    Follow-up: Thursday June 19th 11 am     It was great speaking with you today.  I value your experience and would be very thankful for your time in providing feedback in our clinic survey. In the next few days, you may receive an email or text message from [employer] with a link to a survey related to your  clinical pharmacist.\"     To schedule another MTM appointment, please call the clinic directly or you may call [phone]    My Clinical Pharmacist's contact information:                                                      Please feel free to contact me with any questions or concerns you have. Use the patient portal - address to your provider with subject line \"to Dulce\" or use [employer] to send me a message.      Dulce Hanna, Pharm.D.  Medication Therapy Management Pharmacist  [facility]  "

## 2025-03-31 ENCOUNTER — LAB REQUISITION (OUTPATIENT)
Dept: LAB | Facility: CLINIC | Age: 60
End: 2025-03-31
Payer: COMMERCIAL

## 2025-03-31 DIAGNOSIS — R53.83 OTHER FATIGUE: ICD-10-CM

## 2025-03-31 DIAGNOSIS — Z87.440 PERSONAL HISTORY OF URINARY (TRACT) INFECTIONS: ICD-10-CM

## 2025-03-31 LAB
ANION GAP SERPL CALCULATED.3IONS-SCNC: 13 MMOL/L (ref 7–15)
BUN SERPL-MCNC: 15.1 MG/DL (ref 6–20)
CALCIUM SERPL-MCNC: 9.6 MG/DL (ref 8.8–10.4)
CHLORIDE SERPL-SCNC: 99 MMOL/L (ref 98–107)
CREAT SERPL-MCNC: 0.71 MG/DL (ref 0.51–0.95)
EGFRCR SERPLBLD CKD-EPI 2021: >90 ML/MIN/1.73M2
GLUCOSE SERPL-MCNC: 175 MG/DL (ref 70–99)
HCO3 SERPL-SCNC: 22 MMOL/L (ref 22–29)
POTASSIUM SERPL-SCNC: 4.3 MMOL/L (ref 3.4–5.3)
SODIUM SERPL-SCNC: 134 MMOL/L (ref 135–145)

## 2025-03-31 PROCEDURE — 80048 BASIC METABOLIC PNL TOTAL CA: CPT

## 2025-03-31 PROCEDURE — 82310 ASSAY OF CALCIUM: CPT

## 2025-03-31 PROCEDURE — 87088 URINE BACTERIA CULTURE: CPT | Mod: ORL

## 2025-03-31 PROCEDURE — 87088 URINE BACTERIA CULTURE: CPT

## 2025-04-02 LAB — BACTERIA UR CULT: ABNORMAL

## 2025-04-19 ENCOUNTER — HOSPITAL ENCOUNTER (EMERGENCY)
Facility: CLINIC | Age: 60
Discharge: LEFT BEFORE DISCHARGE | End: 2025-04-19
Admitting: EMERGENCY MEDICINE
Payer: COMMERCIAL

## 2025-04-19 VITALS
BODY MASS INDEX: 30.16 KG/M2 | RESPIRATION RATE: 18 BRPM | HEART RATE: 110 BPM | TEMPERATURE: 97.2 F | DIASTOLIC BLOOD PRESSURE: 80 MMHG | HEIGHT: 65 IN | OXYGEN SATURATION: 98 % | WEIGHT: 181 LBS | SYSTOLIC BLOOD PRESSURE: 135 MMHG

## 2025-04-19 LAB
ALBUMIN UR-MCNC: NEGATIVE MG/DL
APPEARANCE UR: CLEAR
BILIRUB UR QL STRIP: NEGATIVE
COLOR UR AUTO: YELLOW
GLUCOSE BLDC GLUCOMTR-MCNC: 147 MG/DL (ref 70–99)
GLUCOSE UR STRIP-MCNC: NEGATIVE MG/DL
HGB UR QL STRIP: NEGATIVE
KETONES UR STRIP-MCNC: NEGATIVE MG/DL
LEUKOCYTE ESTERASE UR QL STRIP: ABNORMAL
MUCOUS THREADS #/AREA URNS LPF: PRESENT /LPF
NITRATE UR QL: NEGATIVE
PH UR STRIP: 6 [PH] (ref 5–7)
RBC URINE: 2 /HPF
SP GR UR STRIP: 1.02 (ref 1–1.03)
SQUAMOUS EPITHELIAL: 1 /HPF
UROBILINOGEN UR STRIP-MCNC: NORMAL MG/DL
WBC URINE: 16 /HPF

## 2025-04-19 PROCEDURE — 82962 GLUCOSE BLOOD TEST: CPT

## 2025-04-19 PROCEDURE — 81001 URINALYSIS AUTO W/SCOPE: CPT | Performed by: EMERGENCY MEDICINE

## 2025-04-19 PROCEDURE — 87088 URINE BACTERIA CULTURE: CPT | Performed by: EMERGENCY MEDICINE

## 2025-04-19 PROCEDURE — 99281 EMR DPT VST MAYX REQ PHY/QHP: CPT

## 2025-04-19 ASSESSMENT — COLUMBIA-SUICIDE SEVERITY RATING SCALE - C-SSRS
1. IN THE PAST MONTH, HAVE YOU WISHED YOU WERE DEAD OR WISHED YOU COULD GO TO SLEEP AND NOT WAKE UP?: NO
2. HAVE YOU ACTUALLY HAD ANY THOUGHTS OF KILLING YOURSELF IN THE PAST MONTH?: NO
6. HAVE YOU EVER DONE ANYTHING, STARTED TO DO ANYTHING, OR PREPARED TO DO ANYTHING TO END YOUR LIFE?: NO

## 2025-04-20 NOTE — ED TRIAGE NOTES
"Patient presents to ED with reports of low BG readings from her continuous glucometer, reports drinking Pepsi to get her BG up, BG of 147 in triage.  Keyana Rose RN.......4/19/2025 9:57 PM  *patient also having difficulty voiding urine and recent UTI\"     Triage Assessment (Adult)       Row Name 04/19/25 5602          Triage Assessment    Airway WDL WDL        Respiratory WDL    Respiratory WDL WDL        Skin Circulation/Temperature WDL    Skin Circulation/Temperature WDL WDL        Cardiac WDL    Cardiac WDL WDL        Peripheral/Neurovascular WDL    Peripheral Neurovascular WDL WDL        Cognitive/Neuro/Behavioral WDL    Cognitive/Neuro/Behavioral WDL WDL                     "

## 2025-04-22 ENCOUNTER — TELEPHONE (OUTPATIENT)
Dept: NURSING | Facility: CLINIC | Age: 60
End: 2025-04-22
Payer: COMMERCIAL

## 2025-04-22 LAB
BACTERIA UR CULT: ABNORMAL
BACTERIA UR CULT: ABNORMAL

## 2025-04-22 NOTE — TELEPHONE ENCOUNTER
[facility] - LW after Triage    Reason for call: Lab Result Notification     Lab Result (including Rx patient on, if applicable).  If culture, copy of lab report at bottom.  Lab Result: Urine Culture  4/19/25 No antibiotic prescribed    Recommendation based on Pt assessment, NKDA's and Urine Culture Sensitivities:  Start Cefpodoxime     Creatinine Level (mg/dl)   Creatinine   Date Value Ref Range Status   03/31/2025 0.71 0.51 - 0.95 mg/dL Final   07/28/2006 0.80 0.60 - 1.30 mg/dL Final    Creatinine clearance (ml/min), if applicable    Serum creatinine: 0.71 mg/dL 03/31/25 1016  Estimated creatinine clearance: 92.5 mL/min     RN Recommendations/Instructions per [facility] ED lab result protocol:   [facility] ED lab result protocol utilized: Urine Culture    Unable to reach patient/caregiver.   Left voicemail message requesting a call back to [phone] between 9 a.m. and 5:30 p.m. for patient's ED/UC lab results.  Letter pended to be sent via [employer].    4/22/25 at 3:38 pm Pt returning call left by ED Results Team  RN Recommendations/Instructions per [facility] ED lab result protocol:   [facility] ED lab result protocol utilized: Urine culture    Patient's current Symptoms at time of telephone encounter:   Frequency of urination, dark urine, urine with odor.  Pt states she completed an antibiotic one week ago and has been treated twice by her PCP for UTI in the past couple of weeks.  Results of Urine Culture given to pt and pt intends to call her non [facility] PCP now to see what antibiotic she should start on.      Patient/care giver notified to contact your PCP clinic or return to the Emergency department if your:  Symptoms return.  Symptoms worsen or other concerning symptoms.          Ronda Heard, JEFFERSON

## 2025-05-02 ENCOUNTER — LAB REQUISITION (OUTPATIENT)
Dept: LAB | Facility: CLINIC | Age: 60
End: 2025-05-02
Payer: COMMERCIAL

## 2025-05-02 DIAGNOSIS — R30.0 DYSURIA: ICD-10-CM

## 2025-05-02 DIAGNOSIS — E11.9 TYPE 2 DIABETES MELLITUS WITHOUT COMPLICATIONS (H): ICD-10-CM

## 2025-05-02 LAB
ANION GAP SERPL CALCULATED.3IONS-SCNC: 12 MMOL/L (ref 7–15)
BUN SERPL-MCNC: 13.9 MG/DL (ref 6–20)
CALCIUM SERPL-MCNC: 9.6 MG/DL (ref 8.8–10.4)
CHLORIDE SERPL-SCNC: 102 MMOL/L (ref 98–107)
CHOLEST SERPL-MCNC: 160 MG/DL
CREAT SERPL-MCNC: 0.74 MG/DL (ref 0.51–0.95)
CREAT UR-MCNC: 55.7 MG/DL
EGFRCR SERPLBLD CKD-EPI 2021: >90 ML/MIN/1.73M2
FASTING STATUS PATIENT QL REPORTED: NO
FASTING STATUS PATIENT QL REPORTED: NO
GLUCOSE SERPL-MCNC: 99 MG/DL (ref 70–99)
HCO3 SERPL-SCNC: 23 MMOL/L (ref 22–29)
HDLC SERPL-MCNC: 49 MG/DL
LDLC SERPL CALC-MCNC: 65 MG/DL
MICROALBUMIN UR-MCNC: <12 MG/L
MICROALBUMIN/CREAT UR: NORMAL MG/G{CREAT}
NONHDLC SERPL-MCNC: 111 MG/DL
POTASSIUM SERPL-SCNC: 4.1 MMOL/L (ref 3.4–5.3)
SODIUM SERPL-SCNC: 137 MMOL/L (ref 135–145)
TRIGL SERPL-MCNC: 232 MG/DL

## 2025-05-02 PROCEDURE — 80048 BASIC METABOLIC PNL TOTAL CA: CPT | Mod: ORL | Performed by: FAMILY MEDICINE

## 2025-05-02 PROCEDURE — 82043 UR ALBUMIN QUANTITATIVE: CPT | Mod: ORL | Performed by: FAMILY MEDICINE

## 2025-05-02 PROCEDURE — 87086 URINE CULTURE/COLONY COUNT: CPT | Mod: ORL | Performed by: FAMILY MEDICINE

## 2025-05-02 PROCEDURE — 87086 URINE CULTURE/COLONY COUNT: CPT | Performed by: FAMILY MEDICINE

## 2025-05-02 PROCEDURE — 84478 ASSAY OF TRIGLYCERIDES: CPT | Performed by: FAMILY MEDICINE

## 2025-05-04 LAB — BACTERIA UR CULT: NORMAL

## 2025-05-10 ENCOUNTER — HEALTH MAINTENANCE LETTER (OUTPATIENT)
Age: 60
End: 2025-05-10

## 2025-07-24 ENCOUNTER — OFFICE VISIT (OUTPATIENT)
Dept: PHARMACY | Facility: PHYSICIAN GROUP | Age: 60
End: 2025-07-24
Payer: COMMERCIAL

## 2025-07-24 VITALS — WEIGHT: 183 LBS | BODY MASS INDEX: 30.45 KG/M2

## 2025-07-24 DIAGNOSIS — R80.9 MICROALBUMINURIA: ICD-10-CM

## 2025-07-24 DIAGNOSIS — E11.9 TYPE 2 DIABETES MELLITUS TREATED WITHOUT INSULIN (H): Primary | ICD-10-CM

## 2025-07-24 DIAGNOSIS — E78.5 HYPERLIPIDEMIA LDL GOAL <70: ICD-10-CM

## 2025-07-24 DIAGNOSIS — F17.200 NICOTINE DEPENDENCE, UNCOMPLICATED, UNSPECIFIED NICOTINE PRODUCT TYPE: ICD-10-CM

## 2025-07-24 DIAGNOSIS — R52 PAIN: ICD-10-CM

## 2025-07-24 PROCEDURE — 99607 MTMS BY PHARM ADDL 15 MIN: CPT | Performed by: PHARMACIST

## 2025-07-24 PROCEDURE — 99606 MTMS BY PHARM EST 15 MIN: CPT | Performed by: PHARMACIST

## 2025-07-24 NOTE — PATIENT INSTRUCTIONS
"Recommendations from today's MTM visit:                                                      Continue metformin  mg 2 tablets twice daily   Stop meloxicam if you do not need it   Refilled Freestyle Desiree 3 plus sensors and nicotine gum at [facility]   Changed to new yellow butterfly Desiree grisel     Follow-up: 2 weeks with Dr Stern   6 weeks with Dulce     It was great speaking with you today.  I value your experience and would be very thankful for your time in providing feedback in our clinic survey. In the next few days, you may receive an email or text message from [employer] with a link to a survey related to your  clinical pharmacist.\"     To schedule another MTM appointment, please call the clinic directly or you may call [phone]    My Clinical Pharmacist's contact information:                                                      Please feel free to contact me with any questions or concerns you have. Use the patient portal - address to your provider with subject line \"to Dulce\" or use [employer] to send me a message.      Dulce Hanna, Pharm.D.  Medication Therapy Management Pharmacist  [facility]  "

## 2025-07-24 NOTE — PROGRESS NOTES
Medication Therapy Management (MTM) Encounter    ASSESSMENT:                            Smoking cessation:   Encouraged patient to give herself kavon if having slips with quitting smoking, overall reduction in smoking is still a win until she is ready to quit entirely.     Medication Adherence/Access: no issues    Diabetes: Patient is not meeting A1c goal of < 7% but is less than <8%. Time in Range not at goal >70%. Recently elevated with prednisone use. Would benefit from continuing higher dose of metformin given recent elevations, could wean in the future if patient has low blood sugar      Hypertension/microalbuminuria:  Blood pressure at goal <130/80 mmHg today, continue current medications.     Hyperlipidemia:   Stable on moderate intensity statin, last LDL at goal <70 mg/dL. Trigs at goal <500, may not need fibrate in future with diet and blood sugar control    Pain:  Reasonable to go without meloxicam if not needing for pain. Minimizes kidney risks to use as needed instead of scheduled.     PLAN:                            Continue metformin  mg 2 tablets twice daily   Stop meloxicam if you do not need it   Refilled Freestyle Desiree 3 plus sensors and nicotine gum at [facility]   Let the slips slide! Give yourself kavon a slip does not mean a relapse     Medication issues to be addressed at a future visit:   -Consider stopping fenofibrate    Follow-up: 2 weeks with PCP, 6 weeks with Dulce     SUBJECTIVE/OBJECTIVE:                          Jodi Lacy is a 59 year old female coming in for a follow up.     Reason for visit: follow up medication review    Allergies/ADRs: Reviewed in chart  Past Medical History: Reviewed in chart  Alcohol: 2 glasses of wine occasionally - for special occasions   Tobacco: She reports that she has been smoking cigarettes. She does not have any smokeless tobacco history on file.Nicotine/Tobacco Cessation Plan:   Quit smoking in Early January for 6 weeks, resumed smoking  because of family stress but   Quit again 3 days ago   Not using NRT patches, just doing gum - using these = 6-7 times per day, needs refills and currently only gets two boxes as at [facility]     Medication Adherence/Access: no issues reported. Her pillbox goes off at 10AM for her to take her medicines. She has a home health nurse to set up pillboxes    Diabetes   - Mounjaro 15 mg weekly on Saturday/Sunday- has 1 left   - Metformin ER 1000 mg twice daily- was down to 2 tablets daily but her nurse has increased this     Took prednisone course about 2 weeks ago for a bad itchy rash - blood sugar has been higher since then     Symptoms of diabetes: none   Diet/Exercise: stressed with watching grandkids- they are very mean to her    working on portion control  Been too busy with work and [employer] to exercise   Buying sugar free energy drinks   Cut out lemonade- drinking unsweetened tea instead  Has cut out fatty snacks   Working on exercise - goal to get to 6000 steps per day.   Microalbumin: normal  Last A1c 7.3% 5/2/25  7.6% 2/17/25 - Abstracted A1c March 24, 2025        Blood Sugar Ranges (per Desiree 3 continuous glucose monitor):  Name: Jodi Lacy  YOB: 1965  Report Period: 06/27/2025 - 07/24/2025 (28 days)  Generated: 07/24/2025  Time CGM Active: 76%      Glucose Statistics and Targets  Average Glucose: 192 mg/dL  Glucose Management Indicator (GMI): 7.9%  Glucose Variability (%CV): 30.6%    Time in Ranges  Very High: >250 mg/dL --- 17%  High: 181 - 250 mg/dL --- 35%  Target Range: 70 - 180 mg/dL --- 48%  Low: 54 - 69 mg/dL --- 0%  Very Low: <54 mg/dL --- 0%      Eye exam is up to date     Hypertension /Microalbuminuria   -Lisinopril 5 mg daily   Patient reports no current medication side effects  Patient does not self-monitor blood pressure.         Hyperlipidemia   - Atorvastatin 10 mg daily  -fenofibrate 54 mg daily   Patient reports no significant myalgias or other side effect          Pain:  -Meloxicam 15 mg, 1/2 tablet twice daily - this is helping her osteoarthritis   -Methocarbamol 750 mg four times daily as needed for spasms     Wants to stop meloxicam - her knee injections helped   Doing physical therapy - this is helping if she doesn't overdo it      Wt 183 lb (83 kg)   BMI 30.45 kg/m    ----------------    I spent 35 minutes with this patient today. All changes were made via collaborative practice agreement with Eduardo Stern MD.     A summary of these recommendations was given to the patient.    Dulce Hanna, Pharm.D.  Medication Therapy Management Pharmacist     Medication Therapy Recommendations  Pain   1 Current Medication: meloxicam (MOBIC) 15 MG tablet   Current Medication Sig: Take 7.5 mg by mouth 2 times daily as needed for pain   Rationale: No medical indication at this time - Unnecessary medication therapy - Indication   Recommendation: Discontinue Medication   Status: Accepted per CPA   Identified Date: 7/24/2025 Completed Date: 7/30/2025         Type 2 diabetes mellitus treated without insulin (H)   1 Current Medication: metFORMIN (GLUCOPHAGE-XR) 500 MG 24 hr tablet   Current Medication Sig: Take 1,000 mg by mouth 2 times daily (with meals)   Rationale: Does not understand instructions - Adherence - Adherence   Recommendation: Provide Education   Status: Patient Agreed - Adherence/Education   Identified Date: 7/24/2025 Completed Date: 7/30/2025

## 2025-08-23 ENCOUNTER — HEALTH MAINTENANCE LETTER (OUTPATIENT)
Age: 60
End: 2025-08-23